# Patient Record
Sex: MALE | Race: WHITE | Employment: UNEMPLOYED | URBAN - METROPOLITAN AREA
[De-identification: names, ages, dates, MRNs, and addresses within clinical notes are randomized per-mention and may not be internally consistent; named-entity substitution may affect disease eponyms.]

---

## 2017-03-07 ENCOUNTER — OFFICE VISIT (OUTPATIENT)
Dept: FAMILY MEDICINE CLINIC | Age: 47
End: 2017-03-07

## 2017-03-07 ENCOUNTER — HOSPITAL ENCOUNTER (OUTPATIENT)
Dept: GENERAL RADIOLOGY | Age: 47
Discharge: HOME | End: 2017-03-07
Payer: COMMERCIAL

## 2017-03-07 VITALS
DIASTOLIC BLOOD PRESSURE: 78 MMHG | BODY MASS INDEX: 23.04 KG/M2 | RESPIRATION RATE: 16 BRPM | HEIGHT: 67 IN | WEIGHT: 146.8 LBS | HEART RATE: 97 BPM | SYSTOLIC BLOOD PRESSURE: 116 MMHG | OXYGEN SATURATION: 99 % | TEMPERATURE: 97.8 F

## 2017-03-07 DIAGNOSIS — M75.50 SUBSCAPULAR BURSITIS: ICD-10-CM

## 2017-03-07 DIAGNOSIS — M54.2 NECK PAIN: ICD-10-CM

## 2017-03-07 DIAGNOSIS — B19.20 HEPATITIS C VIRUS INFECTION, UNSPECIFIED CHRONICITY: ICD-10-CM

## 2017-03-07 DIAGNOSIS — B20 HIV (HUMAN IMMUNODEFICIENCY VIRUS INFECTION) (HCC): Primary | ICD-10-CM

## 2017-03-07 DIAGNOSIS — F33.0 MILD EPISODE OF RECURRENT MAJOR DEPRESSIVE DISORDER (HCC): ICD-10-CM

## 2017-03-07 DIAGNOSIS — M75.50 SUBACROMIAL BURSITIS: ICD-10-CM

## 2017-03-07 DIAGNOSIS — Z23 ENCOUNTER FOR IMMUNIZATION: ICD-10-CM

## 2017-03-07 DIAGNOSIS — F51.01 PRIMARY INSOMNIA: ICD-10-CM

## 2017-03-07 PROCEDURE — 72050 X-RAY EXAM NECK SPINE 4/5VWS: CPT

## 2017-03-07 RX ORDER — EMTRICITABINE, RILPIVIRINE HYDROCHLORIDE, AND TENOFOVIR ALAFENAMIDE 200; 25; 25 MG/1; MG/1; MG/1
TABLET ORAL
COMMUNITY
Start: 2017-03-04 | End: 2022-02-23 | Stop reason: ALTCHOICE

## 2017-03-07 RX ORDER — SERTRALINE HYDROCHLORIDE 100 MG/1
TABLET, FILM COATED ORAL
Qty: 30 TAB | Refills: 1 | Status: SHIPPED | OUTPATIENT
Start: 2017-03-07 | End: 2017-07-11 | Stop reason: SDUPTHER

## 2017-03-07 RX ORDER — IBUPROFEN 800 MG/1
TABLET ORAL
Refills: 0 | COMMUNITY
Start: 2017-01-23 | End: 2017-12-21

## 2017-03-07 RX ORDER — ERGOCALCIFEROL 1.25 MG/1
CAPSULE ORAL
Refills: 5 | COMMUNITY
Start: 2017-02-11 | End: 2017-07-11 | Stop reason: ALTCHOICE

## 2017-03-07 RX ORDER — ZOLPIDEM TARTRATE 10 MG/1
TABLET ORAL
Refills: 2 | COMMUNITY
Start: 2016-12-16 | End: 2017-03-07 | Stop reason: SDUPTHER

## 2017-03-07 RX ORDER — SERTRALINE HYDROCHLORIDE 100 MG/1
TABLET, FILM COATED ORAL
Refills: 2 | COMMUNITY
Start: 2017-02-21 | End: 2017-03-07 | Stop reason: SDUPTHER

## 2017-03-07 RX ORDER — ZOLPIDEM TARTRATE 10 MG/1
TABLET ORAL
Qty: 30 TAB | Refills: 0 | Status: SHIPPED | OUTPATIENT
Start: 2017-03-07 | End: 2017-07-11 | Stop reason: ALTCHOICE

## 2017-03-07 RX ORDER — PREDNISONE 10 MG/1
TABLET ORAL
Qty: 21 TAB | Refills: 0 | Status: SHIPPED | OUTPATIENT
Start: 2017-03-07 | End: 2017-03-21 | Stop reason: ALTCHOICE

## 2017-03-07 NOTE — MR AVS SNAPSHOT
Visit Information Date & Time Provider Department Dept. Phone Encounter #  
 3/7/2017  3:20 PM Monique Sanford MD [address] at [address] [place] [address] [id number] Follow-up Instructions Return in about 4 weeks (around 4/4/2017), or if symptoms worsen or fail to improve. Upcoming Health Maintenance Date Due DTaP/Tdap/Td series (1 - Tdap) 7/26/1992 Allergies as of 3/7/2017  Review Complete On: 3/7/2017 By: Monique Sanford MD  
  
 Severity Noted Reaction Type Reactions Abacavir High 03/07/2017    Other (comments) Interaction with other medication Current Immunizations  Never Reviewed Name Date Tdap  Incomplete Not reviewed this visit You Were Diagnosed With   
  
 Codes Comments [facility] virus [facility]    -  Primary ICD-10-CM: Z08 ICD-9-CM: V08 Hepatitis C virus infection, unspecified chronicity     ICD-10-CM: B19.20 ICD-9-CM: 070.70 Encounter for immunization     ICD-10-CM: B53 ICD-9-CM: V03.89 Mild episode of recurrent major depressive disorder (HCC)     ICD-10-CM: F33.0 ICD-9-CM: 296.31 Primary insomnia     ICD-10-CM: F51.01 
ICD-9-CM: 307.42 Subacromial bursitis     ICD-10-CM: M75.50 ICD-9-CM: 726.19 Neck pain     ICD-10-CM: M54.2 ICD-9-CM: 723.1 Vitals BP Pulse Temp Resp Height(growth percentile) Weight(growth percentile) 116/78 (BP 1 Location: Left arm, BP Patient Position: Sitting) 97 97.8 °F (36.6 °C) (Oral) 16 5' 7\" (1.702 m) 146 lb 12.8 oz (66.6 kg) SpO2 BMI Smoking Status 99% 22.99 kg/m2 Current Every Day Smoker Vitals History BMI and BSA Data Body Mass Index Body Surface Area  
 22.99 kg/m 2 1.77 m 2 Preferred Pharmacy Pharmacy Name Phone [facility], [address] [phone] Your Updated Medication List  
  
   
 This list is accurate as of: 3/7/17  4:34 PM.  Always use your most recent med list.  
  
  
  
  
 ibuprofen 800 mg tablet Commonly known as:  MOTRIN  
TAKE 1 TABLET BY MOUTH EVERY 12 HOURS WITH FOOD  
  
 ODEFSEY 200-25-25 mg Tab Generic drug:  grgrmkfvyp-cnxspksm-pmqhpe ala  
  
 predniSONE 10 mg dose pack Commonly known as:  STERAPRED DS See administration instruction per 10mg dose pack  
  
 sertraline 100 mg tablet Commonly known as:  ZOLOFT  
TAKE ONE TABLET BY MOUTH ONCE DAILY  
  
 suvorexant 15 mg tablet Commonly known as:  [place]low Ache Take 1 Tab by mouth nightly as needed for Insomnia. Max Daily Amount: 15 mg. VITAMIN D2 50,000 unit capsule Generic drug:  ergocalciferol TAKE 1 CAPSULE BY MOUTH 2 TIMES A WEEK  
  
 zolpidem 10 mg tablet Commonly known as:  AMBIEN  
TAKE 1 TABLET BY MOUTH AT BEDTIME Prescriptions Printed Refills  
 zolpidem (AMBIEN) 10 mg tablet 0 Sig: TAKE 1 TABLET BY MOUTH AT BEDTIME Class: Print  
 suvorexant (BELSOMRA) 15 mg tablet 2 Sig: Take 1 Tab by mouth nightly as needed for Insomnia. Max Daily Amount: 15 mg.  
 Class: Print Route: Oral  
  
Prescriptions Sent to Pharmacy Refills  
 sertraline (ZOLOFT) 100 mg tablet 1 Sig: TAKE ONE TABLET BY MOUTH ONCE DAILY Class: Normal  
 Pharmacy: [address] Ph #: [phone]  
 predniSONE (STERAPRED DS) 10 mg dose pack 0 Sig: See administration instruction per 10mg dose pack Class: Normal  
 Pharmacy: [address] Ph #: [phone] We Performed the Following TETANUS, DIPHTHERIA TOXOIDS AND ACELLULAR PERTUSSIS VACCINE (TDAP), IN INDIVIDS. >=7, IM [id number] CPT(R)] Follow-up Instructions Return in about 4 weeks (around 4/4/2017), or if symptoms worsen or fail to improve. To-Do List   
 03/07/2017 Imaging:  XR SPINE CERV 4 OR 5 V Patient Instructions Shoulder Bursitis: Care Instructions Your Care Instructions Bursitis is inflammation of the bursa. A bursa is a small sac of fluid that cushions a joint and helps it move easily. A bursa sits under the highest point of your shoulder. You can get bursitis by overusing your shoulder, which can happen with activities such as lifting, pitching a ball, or painting. Symptoms of bursitis include pain when you move your arm. Your arm may hurt when you try to lift it, and the pain can reach down the side of your arm. You may have trouble sleeping because of the pain. Bursitis usually gets better if you avoid the activity that caused it. If pain lasts or gets worse despite home treatment, your doctor may draw fluid from the bursa through a needle. This may relieve your pain and help your doctor know if you have an infection. If so, your doctor will prescribe antibiotics. If you have inflammation only, you may get a corticosteroid shot to reduce swelling and pain. Sometimes surgery is needed to drain or remove the bursa. Follow-up care is a key part of your treatment and safety. Be sure to make and go to all appointments, and call your doctor if you are having problems. Its also a good idea to know your test results and keep a list of the medicines you take. How can you care for yourself at home? · Put ice or a cold pack on your shoulder for 10 to 20 minutes at a time. Put a thin cloth between the ice and your skin. · After 3 days of using ice, use heat on your shoulder. You can use a hot water bottle, a heating pad set on low, or a warm, moist towel. Some doctors suggest alternating between hot and cold. · Rest your shoulder. Stop any activities that cause pain. Switch to activities that do not stress your shoulder. · Take your medicines exactly as prescribed. Call your doctor if you think you are having a problem with your medicine.  
· If your doctor recommends it, take anti-inflammatory medicines to reduce pain. These include ibuprofen (Advil, Motrin) and naproxen (Aleve). Read and follow all instructions on the label. · To prevent stiffness, gently move the shoulder joint through its full range of motion. As the pain gets better, keep doing range-of-motion exercises. Ask your doctor for exercises that will make the muscles around the shoulder joint stronger. Do these as directed. · You can slowly return to the activity that caused the pain, but do it with less effort until you can do it without pain or swelling. Be sure to warm up before and stretch after you do the activity. When should you call for help? Call your doctor now or seek immediate medical care if: 
· You have a fever. · You have increased swelling or redness in your shoulder. · You cannot use your shoulder, or the pain in your shoulder gets worse. Watch closely for changes in your health, and be sure to contact your doctor if: 
· You have pain for 2 weeks or longer despite home treatment. Where can you learn more? Go to http://carmel-nicole.info/. Enter M955 in the search box to learn more about \"Shoulder Bursitis: Care Instructions. \" Current as of: May 23, 2016 Content Version: 11.1 © 2034-5318 [employer]. Care instructions adapted under license by [employer] (which disclaims liability or warranty for this information). If you have questions about a medical condition or this instruction, always ask your healthcare professional. [place] [address] any warranty or liability for your use of this information. Introducing [facility]! [address] introduces Medical[employer] patient portal. Now you can access parts of your medical record, email your doctor's office, and request medication refills online. 1. In your internet browser, go to [email or web address]. "[employer] 2. Click on the First Time User? Click Here link in the Sign In box.  You will see the New Member Sign Up page. 3. Enter your Belly Ballot Access Code exactly as it appears below. You will not need to use this code after youve completed the sign-up process. If you do not sign up before the expiration date, you must request a new code. · Belly Ballot Access Code: [email or web address] Expires: 6/5/2017  3:55 PM 
 
4. Enter the last four digits of your Social Security Number (xxxx) and Date of Birth (mm/dd/yyyy) as indicated and click Submit. You will be taken to the next sign-up page. 5. Create a Belly Ballot ID. This will be your Belly Ballot login ID and cannot be changed, so think of one that is secure and easy to remember. 6. Create a Belly Ballot password. You can change your password at any time. 7. Enter your Password Reset Question and Answer. This can be used at a later time if you forget your password. 8. Enter your e-mail address. You will receive e-mail notification when new information is available in [address]. 9. Click Sign Up. You can now view and download portions of your medical record. 10. Click the Download Summary menu link to download a portable copy of your medical information. If you have questions, please visit the Frequently Asked Questions section of the Belly [employer]ot website. Remember, Belly Ballot is NOT to be used for urgent needs. For medical emergencies, dial 911. Now available from your iPhone and Android! Please provide this summary of care documentation to your next provider. If you have any questions after today's visit, please call [phone].

## 2017-03-07 NOTE — PROGRESS NOTES
Subjective:     Chief Complaint   Patient presents with   [facility] patient        He  is a 40 y.o. male who presents for evaluation of:  New pt to [facility] care - Prev seeing Dr. Tim Bey. PMHx for HCV (tx with Venita hargrove in 7/2016), Cirrhosis stage 3 and HIV, and Depression with Insomnia. He is being seen at [place] [address] for tx with Dr. Sybil Young. Cholesterol was a little high when last checked around 2/2017. ldl 163, tg 188, total cholesterol 241    Labs from [facility] reviewed and CD4 abs was 330. RPR reactive and Syphilis IgG +. Prev tx for syphilis years ago but does not appear to have confirmatory testing with [facility] recently. Has been told HIV viral load was undetectable. Not currently sexually active. No regular partners. ROS:  Constitutional: negative except for fevers, chills and fatigue  Eyes: negative for visual disturbance  Ears, nose, mouth, throat, and face: negative for hearing loss and sore throat  Respiratory: negative for cough or dyspnea on exertion  Cardiovascular: negative for chest pain, dyspnea, palpitations, fatigue  Gastrointestinal: negative for nausea, vomiting, change in bowel habits, diarrhea and abdominal pain  Genitourinary:negative for frequency and dysuria  Integument/breast: negative for rash and skin lesion(s)  Hematologic/lymphatic: negative for easy bruising and bleeding  Musculoskeletal: R pain under scapula, occ pain shooting down R arm. Sx x few months. Has not been further w/u with x-rays in past.  Pain marry at night when laying on arm.   Neurological: negative for headaches and dizziness  Behavioral/Psych: per HPI    Past Medical History:   Diagnosis Date    Autoimmune disease ([facility]     2001    Liver disease     Hep C treated 7/2016    Psychotic disorder 2014    Depression     Past Surgical History:   Procedure Laterality Date    HX LYMPH NODE DISSECTION Right 2001    Axilla    HX WISDOM TEETH EXTRACTION Bilateral 1995     No current outpatient prescriptions on file prior to visit. No current facility-administered medications on file prior to visit. Objective:     Vitals:    03/07/17 1540   BP: 116/78   Pulse: 97   Resp: 16   Temp: 97.8 °F (36.6 °C)   TempSrc: Oral   SpO2: 99%   Weight: 146 lb 12.8 oz (66.6 kg)   Height: 5' 7\" (1.702 m)     Physical Examination:  General appearance - alert, well appearing, and in no distress  Eyes -sclera anicteric  Neck - supple, no significant adenopathy, no thyromegaly, no bruits  Chest - clear to auscultation, no wheezes, rales or rhonchi, symmetric air entry  Heart - normal rate, regular rhythm, normal S1, S2, no murmurs, rubs, clicks or gallops  Neurological - alert, oriented, no focal findings or movement disorder noted  Msk - R trap ttp, R scapula with mild ttp, neg neers, neg lindsey, neg apley scratch, pain with neck ROM marry with turning head left and flexion  Extr - no edema    Assessment/ Plan:   Dell Mireles was seen today for establish care. Diagnoses and all orders for this visit:    HIV (human immunodeficiency virus infection) ([facility] - per ID. Concern for Syphilis labs. Awaiting records - suspect syphilis tx in past.    Hepatitis C virus infection, unspecified chronicity - tx with Harvoni. Needs US for [facility] screening q6 months. Discussed this with pt and will await records    Encounter for immunization  -     Tetanus, diphtheria toxoids and acellular pertussis (TDAP) vaccine, in individuals >=7 years, IM    Mild episode of recurrent major depressive disorder (HCC)  -     sertraline (ZOLOFT) 100 mg tablet; TAKE ONE TABLET BY MOUTH ONCE DAILY    Primary insomnia - rec pulling away from Ambien though he has been taking this nightly for ~ 10 yrs  -     zolpidem (AMBIEN) 10 mg tablet; TAKE 1 TABLET BY MOUTH AT BEDTIME  -     suvorexant (BELSOMRA) 15 mg tablet; Take 1 Tab by mouth nightly as needed for Insomnia. Max Daily Amount: 15 mg.     Subscapular bursitis - trying steroids to help with inflammation, x-rays of neck too  -     predniSONE (STERAPRED DS) 10 mg dose pack; See administration instruction per 10mg dose pack  -     XR SPINE CERV 4 OR 5 V; Future    Neck pain - suspect some OA so getting x-rays  -     XR SPINE CERV 4 OR 5 V; Future    I have discussed the diagnosis with the patient and the intended plan as seen in the above orders. The patient has received an after-visit summary and questions were answered concerning future plans. I have discussed medication side effects and warnings with the patient as well. The patient verbalizes understanding and agreement with the plan. Follow-up Disposition:  Return in about 4 weeks (around 4/4/2017), or if symptoms worsen or fail to improve.

## 2017-03-07 NOTE — PROGRESS NOTES
Chief Complaint   Patient presents with   Orma Damme Establish Care     New patient   Refill of Ambien and Sertaline  Room 2

## 2017-03-07 NOTE — PATIENT INSTRUCTIONS
Shoulder Bursitis: Care Instructions  Your Care Instructions    Bursitis is inflammation of the bursa. A bursa is a small sac of fluid that cushions a joint and helps it move easily. A bursa sits under the highest point of your shoulder. You can get bursitis by overusing your shoulder, which can happen with activities such as lifting, pitching a ball, or painting. Symptoms of bursitis include pain when you move your arm. Your arm may hurt when you try to lift it, and the pain can reach down the side of your arm. You may have trouble sleeping because of the pain. Bursitis usually gets better if you avoid the activity that caused it. If pain lasts or gets worse despite home treatment, your doctor may draw fluid from the bursa through a needle. This may relieve your pain and help your doctor know if you have an infection. If so, your doctor will prescribe antibiotics. If you have inflammation only, you may get a corticosteroid shot to reduce swelling and pain. Sometimes surgery is needed to drain or remove the bursa. Follow-up care is a key part of your treatment and safety. Be sure to make and go to all appointments, and call your doctor if you are having problems. Its also a good idea to know your test results and keep a list of the medicines you take. How can you care for yourself at home? · Put ice or a cold pack on your shoulder for 10 to 20 minutes at a time. Put a thin cloth between the ice and your skin. · After 3 days of using ice, use heat on your shoulder. You can use a hot water bottle, a heating pad set on low, or a warm, moist towel. Some doctors suggest alternating between hot and cold. · Rest your shoulder. Stop any activities that cause pain. Switch to activities that do not stress your shoulder. · Take your medicines exactly as prescribed. Call your doctor if you think you are having a problem with your medicine.   · If your doctor recommends it, take anti-inflammatory medicines to reduce pain. These include ibuprofen (Advil, Motrin) and naproxen (Aleve). Read and follow all instructions on the label. · To prevent stiffness, gently move the shoulder joint through its full range of motion. As the pain gets better, keep doing range-of-motion exercises. Ask your doctor for exercises that will make the muscles around the shoulder joint stronger. Do these as directed. · You can slowly return to the activity that caused the pain, but do it with less effort until you can do it without pain or swelling. Be sure to warm up before and stretch after you do the activity. When should you call for help? Call your doctor now or seek immediate medical care if:  · You have a fever. · You have increased swelling or redness in your shoulder. · You cannot use your shoulder, or the pain in your shoulder gets worse. Watch closely for changes in your health, and be sure to contact your doctor if:  · You have pain for 2 weeks or longer despite home treatment. Where can you learn more? Go to http://carmel-nicole.info/. Enter M955 in the search box to learn more about \"Shoulder Bursitis: Care Instructions. \"  Current as of: May 23, 2016  Content Version: 11.1  © 9158-6011 [employer]. Care instructions adapted under license by [employer] (which disclaims liability or warranty for this information). If you have questions about a medical condition or this instruction, always ask your healthcare professional. [place] [address] any warranty or liability for your use of this information.

## 2017-03-16 ENCOUNTER — TELEPHONE (OUTPATIENT)
Dept: FAMILY MEDICINE CLINIC | Age: 47
End: 2017-03-16

## 2017-03-16 NOTE — TELEPHONE ENCOUNTER
Pt wants a call about shoulder - it isn't getting better     He thought he had an April apptmnt set but there was nothing in [place]     He wants to be seen as soon as possible     Best number to reach him is [phone]

## 2017-03-21 ENCOUNTER — OFFICE VISIT (OUTPATIENT)
Dept: FAMILY MEDICINE CLINIC | Age: 47
End: 2017-03-21

## 2017-03-21 VITALS
BODY MASS INDEX: 22.6 KG/M2 | DIASTOLIC BLOOD PRESSURE: 73 MMHG | OXYGEN SATURATION: 98 % | HEIGHT: 67 IN | TEMPERATURE: 97.5 F | RESPIRATION RATE: 16 BRPM | SYSTOLIC BLOOD PRESSURE: 109 MMHG | WEIGHT: 144 LBS | HEART RATE: 85 BPM

## 2017-03-21 DIAGNOSIS — M89.8X1 CHRONIC SCAPULAR PAIN: ICD-10-CM

## 2017-03-21 DIAGNOSIS — M79.18 MYOFASCIAL PAIN: Primary | ICD-10-CM

## 2017-03-21 DIAGNOSIS — G89.29 CHRONIC SCAPULAR PAIN: ICD-10-CM

## 2017-03-21 RX ORDER — TRAMADOL HYDROCHLORIDE 50 MG/1
50 TABLET ORAL
Qty: 20 TAB | Refills: 0 | Status: SHIPPED | OUTPATIENT
Start: 2017-03-21 | End: 2017-07-11 | Stop reason: ALTCHOICE

## 2017-03-21 NOTE — PROGRESS NOTES
Subjective:     Chief Complaint   Patient presents with    Shoulder Pain     Right        He  is a 40 y.o. male who presents for evaluation of:  Scapular pain - x months, pain that was occ shooting down R arm has improved with Prednisone. Nothing has improved pain above scapula. Using heat. X-rays of neck showed sig OA: Degenerative spondylosis C3-C6 with narrowed disc at C3-C4. ROS: per HPI    Past Medical History:   Diagnosis Date    Autoimmune disease ([facility]     2001    Liver disease     Hep C treated 7/2016    Psychotic disorder 2014    Depression     Past Surgical History:   Procedure Laterality Date    HX LYMPH NODE DISSECTION Right 2001    Axilla    HX WISDOM TEETH EXTRACTION Bilateral 1995     Current Outpatient Prescriptions on File Prior to Visit   Medication Sig Dispense Refill    ODEFSEY 200-25-25 mg tab       VITAMIN D2 50,000 unit capsule TAKE 1 CAPSULE BY MOUTH 2 TIMES A WEEK  5    ibuprofen (MOTRIN) 800 mg tablet TAKE 1 TABLET BY MOUTH EVERY 12 HOURS WITH FOOD  0    sertraline (ZOLOFT) 100 mg tablet TAKE ONE TABLET BY MOUTH ONCE DAILY 30 Tab 1    zolpidem (AMBIEN) 10 mg tablet TAKE 1 TABLET BY MOUTH AT BEDTIME 30 Tab 0    suvorexant (BELSOMRA) 15 mg tablet Take 1 Tab by mouth nightly as needed for Insomnia. Max Daily Amount: 15 mg. 30 Tab 2     No current facility-administered medications on file prior to visit.          Objective:     Vitals:    03/21/17 1129   BP: 109/73   Pulse: 85   Resp: 16   Temp: 97.5 °F (36.4 °C)   TempSrc: Oral   SpO2: 98%   Weight: 144 lb (65.3 kg)   Height: 5' 7\" (1.702 m)     Physical Examination:  General appearance - alert, well appearing, and in no distress  Eyes -sclera anicteric  Chest - clear to auscultation, no wheezes, rales or rhonchi, symmetric air entry  Heart - normal rate, regular rhythm, normal S1, S2, no murmurs, rubs, clicks or gallops  Msk - R trap with no ttp, Trigger point just superior to R scapula very ttp at medial superior edge, neg nenino, neg lindsey, neg apley scratch, pain with neck ROM marry with turning head left and flexion    Procedure:  After verbal consent was obtained, risks and benefits of the procedure were discussed with the patient and alternatives discussed. Cleansed with alcohol pads. Injected 1% lidocaine 0.5 ml into trigger point over right scapula. The procedure was well tolerated with no complications      Assessment/ Plan:   There are no diagnoses linked to this encounter. I have discussed the diagnosis with the patient and the intended plan as seen in the above orders. The patient has received an after-visit summary and questions were answered concerning future plans. I have discussed medication side effects and warnings with the patient as well. The patient verbalizes understanding and agreement with the plan.     Follow-up Disposition: Not on [address] NOTE        Chart reviewed for the following:   Ashok Burgess MD, have reviewed the History, Physical and updated the Allergic reactions for [address] performed immediately prior to start of procedure:   Ashok Burgess MD, have performed the following reviews on [place] prior to the start of the procedure:            * Patient was identified by name and date of birth   * Agreement on procedure being performed was verified  * Risks and Benefits explained to the patient  * Procedure site verified and marked as necessary  * Patient was positioned for comfort  * Consent was signed and verified     Time: 11:54am      Date of procedure: 3/21/2017    Procedure performed by:  Modesto Burch MD    Patient assisted by: self    How tolerated by patient: tolerated the procedure well with no complications    Post Procedural Pain Scale: 0 - No Hurt

## 2017-03-21 NOTE — PROGRESS NOTES
Chief Complaint   Patient presents with    Shoulder Pain     Right   Discuss treatment for continued pain  Room 5

## 2017-03-21 NOTE — PATIENT INSTRUCTIONS
Shoulder Blade: Exercises  Your Care Instructions  Here are some examples of typical exercises for your condition. Start each exercise slowly. Ease off the exercise if you start to have pain. Your doctor or physical therapist will tell you when you can start these exercises and which ones will work best for you. How to do the exercises  Shoulder roll    1. Stand tall with your chin slightly tucked. Imagine that a string at the top of your head is pulling you straight up. 2. Keep your arms relaxed. All motion will be in your shoulders. 3. Shrug your shoulders up toward your ears, then up and back. [place] your shoulders down and back, like you're sliding your hands down into your back pants pockets. 4. Repeat the circles at least 2 to 4 times. This exercise is also helpful anytime you want to relax. Lower neck and upper back stretch    1. With your arms about shoulder height, clasp your hands in front of you. 2. Drop your chin toward your chest.  3. Reach straight forward so you are rounding your upper back. Think about pulling your shoulder blades apart. Maryellen Wayne feel a stretch across your upper back and shoulders. Hold for at least 6 seconds. 4. Repeat 2 to 4 times. Triceps stretch    1. Reach your arm straight up. 2. Keeping your elbow in place, bend your arm and reach your hand down behind your back. 3. With your other hand, apply gentle pressure to the bent elbow. Maryellen Wayne feel a stretch at the back of your upper arm and shoulder. Hold about 6 seconds. 4. Repeat 2 to 4 times with each arm. Shoulder stretch    1. Relax your shoulders. 2. Raise one arm to shoulder height, and reach it across your chest.  3. Pull the arm slightly toward you with your other arm. This will help you get a gentle stretch. Hold for about 6 seconds. 4. Repeat 2 to 4 times. Shoulder blade squeeze    1. Sit or stand up tall with your arms at your sides. 2. Keep your shoulders relaxed and down, not shrugged.   3. Squeeze your shoulder blades together. Hold for 6 seconds, then relax. 4. Repeat 8 to 12 times. Straight-arm shoulder blade squeeze    1. Sit or stand tall. Relax your shoulders. 2. With palms down, hold your elastic tubing or band straight out in front of you. 3. Start with slight tension in the tubing or band, with your hands about shoulder-width apart. 4. Slowly pull straight out to the sides, squeezing your shoulder blades together. Keep your arms straight and at shoulder height. Slowly release. 5. Repeat 8 to 12 times. Rowing    1. [place] your elastic tubing or band at about waist height. Take one end in each hand. 2. Sit or stand with your feet hip-width apart. 3. Hold your arms straight in front of you. Adjust your distance to create slight tension in the tubing or band. 4. Slightly tuck your chin. Relax your shoulders. 5. Without shrugging your shoulders, pull straight back. Your elbows will pass alongside your waist.  Pull-downs    1. [place] your elastic tubing or band in the top of a closed door. Take one end in each hand. 2. Either sit or stand, depending on what is more comfortable. If you feel unsteady, sit on a chair. 3. Start with your arms up and comfortably apart, elbows straight. There should be a slight tension in the tubing or band. 4. Slightly tuck your chin, and look straight ahead. 5. Keeping your back straight, slowly pull down and back, bending your elbows. 6. Stop where your hands are level with your chin, in a \"goalpost\" position. 7. Repeat 8 to 12 times. Chest T stretch    1. Lie on your back. Raise your knees so they are bent. Plant your feet on the floor, hip-width apart. 2. Tuck your chin, and relax your shoulders. 3. Reach your arms straight out to the sides. If you don't feel a mild stretch in your shoulders and across your chest, use a foam roll or a tightly rolled blanket under your spine, from your tailbone to your head.   4. Relax in this position for at least 15 to 30 seconds while you breathe normally. Repeat 2 to 4 times. As you get used to this stretch, keep adding a little more time until you are able relax in this position for 2 or 3 minutes. When you can relax for at least 2 minutes, you only need to do the exercise 1 time per session. Chest goalpost stretch    1. Lie on your back. Raise your knees so they are bent. Plant your feet on the floor, hip-width apart. 2. Tuck your chin, and relax your shoulders. 3. Reach your arms straight out to the sides. 4. Bend your arms at the elbows, with your hands pointed toward the top of your head. Your arms should make an L on either side of your head. Your palms should be facing up. 5. If you don't feel a mild stretch in your shoulders and across your chest, use a foam roll or tightly rolled blanket under your spine, from your tailbone to your head. 6. Relax in this position for at least 15 to 30 seconds while you breathe normally. Repeat 2 to 4 times. Each day you do this exercise, add a little more time until you can relax in this position for 2 or 3 minutes. When you can relax for at least 2 minutes, you only need to do the exercise 1 time per session. Follow-up care is a key part of your treatment and safety. Be sure to make and go to all appointments, and call your doctor if you are having problems. It's also a good idea to know your test results and keep a list of the medicines you take. Where can you learn more? Go to http://carmel-nicole.info/. Enter ([phone] in the search box to learn more about \"Shoulder Blade: Exercises. \"  Current as of: May 23, 2016  Content Version: 11.1  © 2435-7455 [employer]. Care instructions adapted under license by [employer] (which disclaims liability or warranty for this information).  If you have questions about a medical condition or this instruction, always ask your healthcare professional. Osiris Bond disclaims any warranty or liability for your use of this information.

## 2017-03-21 NOTE — MR AVS SNAPSHOT
Visit Information Date & Time Provider Department Dept. Phone Encounter #  
 3/21/2017 11:10 AM Sandrine Lagos MD [facility] at [address] [id number] Follow-up Instructions Return in about 4 weeks (around 4/18/2017), or if symptoms worsen or fail to improve. Upcoming Health Maintenance Date Due Pneumococcal 19-64 Highest Risk (1 of 3 - PCV13) 7/26/1990 DTaP/Tdap/Td series (2 - Td) 3/7/2027 Allergies as of 3/21/2017  Review Complete On: 3/7/2017 By: Sandrine Lagos MD  
  
 Severity Noted Reaction Type Reactions Abacavir High 03/07/2017    Other (comments) Interaction with other medication Current Immunizations  Never Reviewed Name Date Influenza Vaccine 10/20/2016 Pneumococcal Conjugate (PCV-13) 3/21/2015 Tdap 3/7/2017 Not reviewed this visit You Were Diagnosed With   
  
 Codes Comments Myofascial pain    -  Primary ICD-10-CM: M79.1 ICD-9-CM: 729.1 Chronic scapular pain     ICD-10-CM: M89.8X1, G89.29 ICD-9-CM: 733.90, 338.29 Vitals BP Pulse Temp Resp Height(growth percentile) Weight(growth percentile) 109/73 (BP 1 Location: Right arm, BP Patient Position: Sitting) 85 97.5 °F (36.4 °C) (Oral) 16 5' 7\" (1.702 m) 144 lb (65.3 kg) SpO2 BMI Smoking Status 98% 22.55 kg/m2 Current Every Day Smoker BMI and BSA Data Body Mass Index Body Surface Area  
 22.55 kg/m 2 1.76 m 2 Preferred Pharmacy Pharmacy Name Phone [facility] [address] [phone] Your Updated Medication List  
  
   
This list is accurate as of: 3/21/17 12:02 PM.  Always use your most recent med list.  
  
  
  
  
 ibuprofen 800 mg tablet Commonly known as:  MOTRIN  
TAKE 1 TABLET BY MOUTH EVERY 12 HOURS WITH FOOD  
  
 ODEFSEY 200-25-25 mg Tab Generic drug:  htgclevgou-yyotudnb-dxpeir ala  
  
 sertraline 100 mg tablet Commonly known as:  ZOLOFT  
 TAKE ONE TABLET BY MOUTH ONCE DAILY  
  
 suvorexant 15 mg tablet Commonly known as:  June Mcclellan Take 1 Tab by mouth nightly as needed for Insomnia. Max Daily Amount: 15 mg. VITAMIN D2 50,000 unit capsule Generic drug:  ergocalciferol TAKE 1 CAPSULE BY MOUTH 2 TIMES A WEEK  
  
 zolpidem 10 mg tablet Commonly known as:  AMBIEN  
TAKE 1 TABLET BY MOUTH AT BEDTIME We Performed the Following [facility] INJECT TRIGGER POINT, 1 OR 2 [id number] CPT(R)] Follow-up Instructions Return in about 4 weeks (around 4/18/2017), or if symptoms worsen or fail to improve. Patient Instructions Shoulder Blade: Exercises Your Care Instructions Here are some examples of typical exercises for your condition. Start each exercise slowly. Ease off the exercise if you start to have pain. Your doctor or physical therapist will tell you when you can start these exercises and which ones will work best for you. How to do the exercises Shoulder roll 1. Stand tall with your chin slightly tucked. Imagine that a string at the top of your head is pulling you straight up. 2. Keep your arms relaxed. All motion will be in your shoulders. 3. Shrug your shoulders up toward your ears, then up and back. [place] your shoulders down and back, like you're sliding your hands down into your back pants pockets. 4. Repeat the circles at least 2 to 4 times. This exercise is also helpful anytime you want to relax. Lower neck and upper back stretch 1. With your arms about shoulder height, clasp your hands in front of you. 2. Drop your chin toward your chest. 
3. Reach straight forward so you are rounding your upper back. Think about pulling your shoulder blades apart. Riley Azul feel a stretch across your upper back and shoulders. Hold for at least 6 seconds. 4. Repeat 2 to 4 times. Triceps stretch 1. Reach your arm straight up.  
2. Keeping your elbow in place, bend your arm and reach your hand down behind your back. 3. With your other hand, apply gentle pressure to the bent elbow. Darryl Matter feel a stretch at the back of your upper arm and shoulder. Hold about 6 seconds. 4. Repeat 2 to 4 times with each arm. Shoulder stretch 1. Relax your shoulders. 2. Raise one arm to shoulder height, and reach it across your chest. 
3. Pull the arm slightly toward you with your other arm. This will help you get a gentle stretch. Hold for about 6 seconds. 4. Repeat 2 to 4 times. Shoulder blade squeeze 1. Sit or stand up tall with your arms at your sides. 2. Keep your shoulders relaxed and down, not shrugged. 3. Squeeze your shoulder blades together. Hold for 6 seconds, then relax. 4. Repeat 8 to 12 times. Straight-arm shoulder blade squeeze 1. Sit or stand tall. Relax your shoulders. 2. With palms down, hold your elastic tubing or band straight out in front of you. 3. Start with slight tension in the tubing or band, with your hands about shoulder-width apart. 4. Slowly pull straight out to the sides, squeezing your shoulder blades together. Keep your arms straight and at shoulder height. Slowly release. 5. Repeat 8 to 12 times. Rowing 1. [place] your elastic tubing or band at about waist height. Take one end in each hand. 2. Sit or stand with your feet hip-width apart. 3. Hold your arms straight in front of you. Adjust your distance to create slight tension in the tubing or band. 4. Slightly tuck your chin. Relax your shoulders. 5. Without shrugging your shoulders, pull straight back. Your elbows will pass alongside your waist. 
Pull-downs 1. [place] your elastic tubing or band in the top of a closed door. Take one end in each hand. 2. Either sit or stand, depending on what is more comfortable. If you feel unsteady, sit on a chair. 3. Start with your arms up and comfortably apart, elbows straight. There should be a slight tension in the tubing or band. 4. Slightly tuck your chin, and look straight ahead. 5. Keeping your back straight, slowly pull down and back, bending your elbows. 6. Stop where your hands are level with your chin, in a \"goalpost\" position. 7. Repeat 8 to 12 times. Chest T stretch 1. Lie on your back. Raise your knees so they are bent. Plant your feet on the floor, hip-width apart. 2. Tuck your chin, and relax your shoulders. 3. Reach your arms straight out to the sides. If you don't feel a mild stretch in your shoulders and across your chest, use a foam roll or a tightly rolled blanket under your spine, from your tailbone to your head. 4. Relax in this position for at least 15 to 30 seconds while you breathe normally. Repeat 2 to 4 times. As you get used to this stretch, keep adding a little more time until you are able relax in this position for 2 or 3 minutes. When you can relax for at least 2 minutes, you only need to do the exercise 1 time per session. Chest goalpost stretch 1. Lie on your back. Raise your knees so they are bent. Plant your feet on the floor, hip-width apart. 2. Tuck your chin, and relax your shoulders. 3. Reach your arms straight out to the sides. 4. Bend your arms at the elbows, with your hands pointed toward the top of your head. Your arms should make an L on either side of your head. Your palms should be facing up. 5. If you don't feel a mild stretch in your shoulders and across your chest, use a foam roll or tightly rolled blanket under your spine, from your tailbone to your head. 6. Relax in this position for at least 15 to 30 seconds while you breathe normally. Repeat 2 to 4 times. Each day you do this exercise, add a little more time until you can relax in this position for 2 or 3 minutes. When you can relax for at least 2 minutes, you only need to do the exercise 1 time per session. Follow-up care is a key part of your treatment and safety.  Be sure to make and go to all appointments, and call your doctor if you are having problems. It's also a good idea to know your test results and keep a list of the medicines you take. Where can you learn more? Go to http://carmel-nicole.info/. Enter ([phone] 7072 2666 in the search box to learn more about \"Shoulder Blade: Exercises. \" Current as of: May 23, 2016 Content Version: 11.1 © 8464-4616 [employer]. Care instructions adapted under license by [employer] (which disclaims liability or warranty for this information). If you have questions about a medical condition or this instruction, always ask your healthcare professional. [address] any warranty or liability for your use of this information. Introducing [facility] & HEALTH SERVICES! Dear Srinivasa Wood: 
Thank you for requesting a Elance account. Our records indicate that you already have an active Elance account. You can access your account anytime at [email or web address] Did you know that you can access your hospital and ER discharge instructions at any time in Elance? You can also review all of your test results from your hospital stay or ER visit. Additional Information If you have questions, please visit the Frequently Asked Questions section of the Elance website at [email or web address] Remember, Elance is NOT to be used for urgent needs. For medical emergencies, dial 911. Now available from your iPhone and Android! Please provide this summary of care documentation to your next provider. Your primary care clinician is listed as Sandrine Lagos. If you have any questions after today's visit, please call [phone].

## 2017-05-05 ENCOUNTER — DOCUMENTATION ONLY (OUTPATIENT)
Dept: FAMILY MEDICINE CLINIC | Age: 47
End: 2017-05-05

## 2017-06-13 ENCOUNTER — TELEPHONE (OUTPATIENT)
Dept: FAMILY MEDICINE CLINIC | Age: 47
End: 2017-06-13

## 2017-06-13 NOTE — TELEPHONE ENCOUNTER
Pt calling for referral for tomorrow     [facility] -   Dr. Camejo Files   RE:  6 mos f/u     9:00 am on 6/14/17    Best number to reach him is [phone]

## 2017-07-11 ENCOUNTER — OFFICE VISIT (OUTPATIENT)
Dept: FAMILY MEDICINE CLINIC | Age: 47
End: 2017-07-11

## 2017-07-11 VITALS
OXYGEN SATURATION: 98 % | HEART RATE: 70 BPM | WEIGHT: 139.4 LBS | SYSTOLIC BLOOD PRESSURE: 97 MMHG | TEMPERATURE: 97.6 F | RESPIRATION RATE: 16 BRPM | DIASTOLIC BLOOD PRESSURE: 66 MMHG | BODY MASS INDEX: 21.88 KG/M2 | HEIGHT: 67 IN

## 2017-07-11 DIAGNOSIS — F33.0 MILD EPISODE OF RECURRENT MAJOR DEPRESSIVE DISORDER (HCC): ICD-10-CM

## 2017-07-11 DIAGNOSIS — F51.01 PRIMARY INSOMNIA: ICD-10-CM

## 2017-07-11 DIAGNOSIS — Z00.00 WELL ADULT EXAM: Primary | ICD-10-CM

## 2017-07-11 DIAGNOSIS — A63.0 GENITAL WARTS: ICD-10-CM

## 2017-07-11 DIAGNOSIS — R35.1 NOCTURIA: ICD-10-CM

## 2017-07-11 LAB
BILIRUB UR QL STRIP: NORMAL
GLUCOSE UR-MCNC: NEGATIVE MG/DL
KETONES P FAST UR STRIP-MCNC: NEGATIVE MG/DL
PH UR STRIP: 6.5 [PH] (ref 4.6–8)
PROT UR QL STRIP: NORMAL MG/DL
SP GR UR STRIP: 1.02 (ref 1–1.03)
UA UROBILINOGEN AMB POC: NORMAL (ref 0.2–1)
URINALYSIS CLARITY POC: CLEAR
URINALYSIS COLOR POC: NORMAL
URINE BLOOD POC: NEGATIVE
URINE LEUKOCYTES POC: NEGATIVE
URINE NITRITES POC: NEGATIVE

## 2017-07-11 RX ORDER — PRAVASTATIN SODIUM 40 MG/1
TABLET ORAL
COMMUNITY
Start: 2017-07-10 | End: 2019-03-29

## 2017-07-11 RX ORDER — SERTRALINE HYDROCHLORIDE 100 MG/1
TABLET, FILM COATED ORAL
Qty: 30 TAB | Refills: 2 | Status: SHIPPED | OUTPATIENT
Start: 2017-07-11 | End: 2017-07-11 | Stop reason: SDUPTHER

## 2017-07-11 RX ORDER — IMIQUIMOD 12.5 MG/.25G
CREAM TOPICAL
Qty: 36 PACKET | Refills: 0 | Status: SHIPPED | OUTPATIENT
Start: 2017-07-11 | End: 2017-12-21

## 2017-07-11 NOTE — MR AVS SNAPSHOT
Visit Information Date & Time Provider Department Dept. Phone Encounter #  
 7/11/2017  9:50 AM Debbie Alberts MD [facility] at [address] [id number] Follow-up Instructions Return in about 4 weeks (around 8/8/2017), or if symptoms worsen or fail to improve. Upcoming Health Maintenance Date Due INFLUENZA AGE 9 TO ADULT 8/1/2017 Pneumococcal 19-64 Highest Risk (3 of 3 - PPSV23) 7/11/2022 DTaP/Tdap/Td series (2 - Td) 3/7/2027 Allergies as of 7/11/2017  Review Complete On: 7/11/2017 By: Debbie Alberts MD  
  
 Severity Noted Reaction Type Reactions Abacavir High 03/07/2017    Other (comments) Interaction with other medication Current Immunizations  Never Reviewed Name Date Influenza Vaccine 10/20/2016 Pneumococcal Conjugate (PCV-13) 3/21/2015 Tdap 3/7/2017 Not reviewed this visit You Were Diagnosed With   
  
 Codes Comments Well adult exam    -  Primary ICD-10-CM: Z00.00 ICD-9-CM: V70.0 Primary insomnia     ICD-10-CM: F51.01 
ICD-9-CM: 307.42 Mild episode of recurrent major depressive disorder (HCC)     ICD-10-CM: F33.0 ICD-9-CM: 296.31 Nocturia     ICD-10-CM: R35.1 ICD-9-CM: 788.43 Genital warts     ICD-10-CM: A63.0 ICD-9-CM: 078.11 Vitals BP Pulse Temp Resp Height(growth percentile) Weight(growth percentile) 97/66 (BP 1 Location: Right arm, BP Patient Position: Sitting) 70 97.6 °F (36.4 °C) (Oral) 16 5' 7\" (1.702 m) 139 lb 6.4 oz (63.2 kg) SpO2 BMI Smoking Status 98% 21.83 kg/m2 Current Every Day Smoker Vitals History BMI and BSA Data Body Mass Index Body Surface Area  
 21.83 kg/m 2 1.73 m 2 Preferred Pharmacy Pharmacy Name Phone [facility] [address] [facility], [address] [phone] Your Updated Medication List  
  
   
This list is accurate as of: 7/11/17 11:48 AM.  Always use your most recent med list.  
  
  
 ibuprofen 800 mg tablet Commonly known as:  MOTRIN  
TAKE 1 TABLET BY MOUTH EVERY 12 HOURS WITH FOOD  
  
 imiquimod 5 % cream  
Commonly known as:  ALDARA  
apply a thin layer to affected area 3x per week (Monday, Wednesday, and Friday) x 12 weeks ODEFSEY 200-25-25 mg Tab Generic drug:  uimkzioeyy-bipefywu-rmvvww ala  
  
 pravastatin 40 mg tablet Commonly known as:  PRAVACHOL  
  
 sertraline 100 mg tablet Commonly known as:  ZOLOFT  
TAKE ONE TABLET BY MOUTH ONCE DAILY  
  
 suvorexant 15 mg tablet Commonly known as:  Suella Joseph Take 1 Tab by mouth nightly as needed for Insomnia. Max Daily Amount: 15 mg.  
  
  
  
  
Prescriptions Printed Refills  
 suvorexant (BELSOMRA) 15 mg tablet 5 Sig: Take 1 Tab by mouth nightly as needed for Insomnia. Max Daily Amount: 15 mg.  
 Class: Print Route: Oral  
  
Prescriptions Sent to Pharmacy Refills  
 sertraline (ZOLOFT) 100 mg tablet 2 Sig: TAKE ONE TABLET BY MOUTH ONCE DAILY Class: Normal  
 Pharmacy: [address] Ph #: [phone]  
 imiquimod (ALDARA) 5 % cream 0 Sig: apply a thin layer to affected area 3x per week (Monday, Wednesday, and Friday) x 12 weeks Class: Normal  
 Pharmacy: [address] Ph #: [phone] We Performed the Following AMB POC URINALYSIS DIP STICK AUTO W/O MICRO [53550 CPT(R)] HEMOGLOBIN A1C WITH EAG [57600 CPT(R)] PROSTATE SPECIFIC AG (PSA) [id number] CPT(R)] TSH 3RD GENERATION [21389 CPT(R)] Follow-up Instructions Return in about 4 weeks (around 8/8/2017), or if symptoms worsen or fail to improve. Introducing [facility] & HEALTH SERVICES! Dear Bard Davies: 
Thank you for requesting a [employer] account. Our records indicate that you already have an active Sure[employer]Sign [employer] account. You can access your account anytime at [email or web address] Did you know that you can access your hospital and ER discharge instructions at any time in [email or web address]? You can also review all of your test results from your hospital stay or ER visit. Additional Information If you have questions, please visit the Frequently Asked Questions section of the [email or web address] website at [email or web address] Remember, [email or web address] is NOT to be used for urgent needs. For medical emergencies, dial 911. Now available from your iPhone and Android! Please provide this summary of care documentation to your next provider. Your primary care clinician is listed as Delorise Los Altos. If you have any questions after today's visit, please call [phone].

## 2017-07-11 NOTE — PROGRESS NOTES
Chief Complaint   Patient presents with   [facility] Complete Physical     Annual   Room 5

## 2017-07-11 NOTE — PROGRESS NOTES
Subjective:     Chief Complaint   Patient presents with    Complete Physical     Annual      He  is a 40 y.o. male who presents for evaluation of: CPE    Today c/o rawness with itching and some fecal discharge around anus and notices genital wart appearance for the past 2-3 weeks. No rectal bleeding or tenesmus. Worse when sweating. Not currently sexually active but hx of receptive anal intercourse. No regular partners. HIV well controlled. Does not think he has every been told he has HPV. No n/v, diarrhea, or constipation. No fevers. No concerns with inguinal LAD. Labs from [facility] reviewed and CD4 abs was 330. RPR reactive and Syphilis IgG +. Prev tx for syphilis years ago but does not appear to have confirmatory testing with [facility] recently. Has been told HIV viral load was undetectable. Exercising - walking  Dieting - Yes  Smoking - 1 ppd  Dentist - seen regularly    PMHx for HCV (tx with Miguel Pay back in 7/2016), Cirrhosis stage 3,  HIV tx with Odefsey, and Depression with Insomnia. He is being seen at [place] [address] for tx with ALONSO Sánchez. Depression well controlled with Zoloft. Cholesterol was a little high when last checked around 2/2017. ldl 163, tg 188, total cholesterol 241. Started on Pravastatin. Vit D Def - has been taking weekly Vit D (prev on 2 x per week). Recent levels with ID showed Vit D of 91 so supplement was stopped. We discussed that Vit D tox is rare and I would not expect this until levels were much higher.     ROS:  Constitutional: negative for fevers, chills and fatigue  Eyes: negative for visual disturbance  Ears, nose, mouth, throat, and face: negative for hearing loss and sore throat  Respiratory: negative for cough or dyspnea on exertion  Cardiovascular: negative for chest pain, dyspnea, palpitations, fatigue  Gastrointestinal: per HPI  Genitourinary:negative for frequency and dysuria  Integument/breast: negative for rash and skin lesion(s)  Hematologic/lymphatic: negative for easy bruising and bleeding  Musculoskeletal:negative for myalgias and muscle weakness  Neurological: negative for headaches and dizziness  Behavioral/Psych: per HPI     Objective:     Vitals:    07/11/17 1034   BP: 97/66   Pulse: 70   Resp: 16   Temp: 97.6 °F (36.4 °C)   TempSrc: Oral   SpO2: 98%   Weight: 139 lb 6.4 oz (63.2 kg)   Height: 5' 7\" (1.702 m)     Physical Examination:  General appearance - alert, well appearing, and in no distress  Eyes - sclera anicteric  Nose - normal and patent, no erythema, discharge or polyps  Mouth - mucous membranes moist, pharynx normal without lesions  Neck - supple, no significant adenopathy  Lymphatics - no palpable lymphadenopathy, no hepatosplenomegaly  Chest - clear to auscultation, no wheezes, rales or rhonchi, symmetric air entry  Heart - normal rate, regular rhythm, normal S1, S2, no murmurs, rubs, clicks or gallops  Abdomen - soft, nontender, nondistended, no masses or organomegaly  Rectal - single verrucous papilliform type growth protruding from anus. Mild surrounding erythema  Neurological - alert, oriented, normal speech, no focal findings or movement disorder noted  Musculoskeletal - no joint tenderness, deformity or swelling  Extremities - no edema  Psych - nml mood and affect    Past Medical History:   Diagnosis Date    Autoimmune disease ([address]     2001    Liver disease     Hep C treated 7/2016    Psychotic disorder 2014    Depression     Past Surgical History:   Procedure Laterality Date    HX LYMPH NODE DISSECTION Right 2001    Axilla    HX WISDOM TEETH EXTRACTION Bilateral 1995     Current Outpatient Prescriptions on File Prior to Visit   Medication Sig Dispense Refill    ODEFSEY 200-25-25 mg tab       ibuprofen (MOTRIN) 800 mg tablet TAKE 1 TABLET BY MOUTH EVERY 12 HOURS WITH FOOD  0     No current facility-administered medications on file prior to visit.         Assessment/ Plan:   Alison Willard was seen today for complete physical.    Diagnoses and all orders for this visit:    Well adult exam  -     PROSTATE SPECIFIC AG  -     HEMOGLOBIN A1C WITH EAG  -     TSH 3RD GENERATION  -     AMB POC URINALYSIS DIP STICK AUTO W/O MICRO    Primary insomnia - improved sleep with Belsomra, does wake up to urinate nightly  -     suvorexant (BELSOMRA) 15 mg tablet; Take 1 Tab by mouth nightly as needed for Insomnia. Max Daily Amount: 15 mg. Mild episode of recurrent major depressive disorder (HCC) - stable  -     sertraline (ZOLOFT) 100 mg tablet; TAKE ONE TABLET BY MOUTH ONCE DAILY    Nocturia - as above, UA with no signs of UTI  -     PROSTATE SPECIFIC AG  -     AMB POC URINALYSIS DIP STICK AUTO W/O MICRO    Anal lesion - will tx for condyloma but if not improving in 4 weeks, would strongly consider anal squamous intraepithelial lesion and will need further w/u with bx.  -     imiquimod (ALDARA) 5 % cream; apply a thin layer to affected area 3x per week (Monday, Wednesday, and Friday) x 12 weeks    I have discussed the diagnosis with the patient and the intended plan as seen in the above orders. The patient has received an after-visit summary and questions were answered concerning future plans. I have discussed medication side effects and warnings with the patient as well. The patient verbalizes understanding and agreement with the plan. Follow-up Disposition:  Return in about 4 weeks (around 8/8/2017), or if symptoms worsen or fail to improve.

## 2017-07-12 LAB
EST. AVERAGE GLUCOSE BLD GHB EST-MCNC: 103 MG/DL
HBA1C MFR BLD: 5.2 % (ref 4.8–5.6)
PSA SERPL-MCNC: 0.6 NG/ML (ref 0–4)
TSH SERPL DL<=0.005 MIU/L-ACNC: 0.63 UIU/ML (ref 0.45–4.5)

## 2017-07-18 ENCOUNTER — TELEPHONE (OUTPATIENT)
Dept: FAMILY MEDICINE CLINIC | Age: 47
End: 2017-07-18

## 2017-07-18 NOTE — TELEPHONE ENCOUNTER
Pt reports worsening of symptom since last apptmnt   His bottom is red and hurts very much, hard to walk   PSR scheduled him for 7/19/17 but he wanted to let Dr. Yamile Egan know what was going on     Best number to reach him is [phone]

## 2017-07-19 ENCOUNTER — OFFICE VISIT (OUTPATIENT)
Dept: FAMILY MEDICINE CLINIC | Age: 47
End: 2017-07-19

## 2017-07-19 VITALS
HEART RATE: 65 BPM | WEIGHT: 137 LBS | BODY MASS INDEX: 21.5 KG/M2 | TEMPERATURE: 97.5 F | OXYGEN SATURATION: 98 % | HEIGHT: 67 IN | SYSTOLIC BLOOD PRESSURE: 107 MMHG | DIASTOLIC BLOOD PRESSURE: 75 MMHG | RESPIRATION RATE: 16 BRPM

## 2017-07-19 DIAGNOSIS — K62.9 ANAL LESION: Primary | ICD-10-CM

## 2017-07-19 DIAGNOSIS — G89.29 CHRONIC LEFT SI JOINT PAIN: ICD-10-CM

## 2017-07-19 DIAGNOSIS — M53.3 CHRONIC LEFT SI JOINT PAIN: ICD-10-CM

## 2017-07-19 RX ORDER — TRAMADOL HYDROCHLORIDE 50 MG/1
50 TABLET ORAL
Qty: 30 TAB | Refills: 0 | Status: SHIPPED | OUTPATIENT
Start: 2017-07-19 | End: 2017-12-21

## 2017-07-19 RX ORDER — PREDNISONE 20 MG/1
TABLET ORAL
Qty: 18 TAB | Refills: 0 | Status: SHIPPED | OUTPATIENT
Start: 2017-07-19 | End: 2017-07-28

## 2017-07-19 NOTE — PROGRESS NOTES
Chief Complaint   Patient presents with    Anal Pain     Itching,Burning,No bleeding but yellowish greenish drainage from rectum   Room 5      Appointment scheduled for Dr Kamran Velásquez @ [address] [place] [address] for 7/21/17 @ 11 AM

## 2017-07-19 NOTE — PROGRESS NOTES
Labs looked good. Pt seen again in office today. Can review labs further at f/u appt coming up soon.

## 2017-07-19 NOTE — MR AVS SNAPSHOT
Visit Information Date & Time Provider Department Dept. Phone Encounter #  
 7/19/2017 11:10 AM Gillian Macias MD [facility] at [address] [id number] Follow-up Instructions Return in about 4 weeks (around 8/16/2017), or if symptoms worsen or fail to improve. Your Appointments 8/15/2017  8:30 AM  
ROUTINE CARE with Gillian Macias MD  
[facility] at [facility] [address]) Appt Note: 4-6 week [facility] [address] [place] [address] Upcoming Health Maintenance Date Due INFLUENZA AGE 9 TO ADULT 8/1/2017 Pneumococcal 19-64 Highest Risk (3 of 3 - PPSV23) 7/11/2022 DTaP/Tdap/Td series (2 - Td) 3/7/2027 Allergies as of 7/19/2017  Review Complete On: 7/19/2017 By: Gillian Macias MD  
  
 Severity Noted Reaction Type Reactions Abacavir High 03/07/2017    Other (comments) Interaction with other medication Current Immunizations  Never Reviewed Name Date Influenza Vaccine 10/20/2016 Pneumococcal Conjugate (PCV-13) 3/21/2015 Tdap 3/7/2017 Not reviewed this visit You Were Diagnosed With   
  
 Codes Comments Anal lesion    -  Primary ICD-10-CM: K62.9 ICD-9-CM: 569.49 Chronic left SI joint pain     ICD-10-CM: M53.3, G89.29 ICD-9-CM: 724.6, 338.29 Vitals BP Pulse Temp Resp Height(growth percentile) Weight(growth percentile) 107/75 (BP 1 Location: Left arm, BP Patient Position: Sitting) 65 97.5 °F (36.4 °C) (Oral) 16 5' 7\" (1.702 m) 137 lb (62.1 kg) SpO2 BMI Smoking Status 98% 21.46 kg/m2 Current Every Day Smoker Vitals History BMI and BSA Data Body Mass Index Body Surface Area  
 21.46 kg/m 2 1.71 m 2 Preferred Pharmacy Pharmacy Name Phone [facility] [address] [facility], [address] [phone] Your Updated Medication List  
  
   
This list is accurate as of: 7/19/17 12:51 PM.  Always use your most recent med list.  
  
  
  
  
 ibuprofen 800 mg tablet Commonly known as:  MOTRIN  
TAKE 1 TABLET BY MOUTH EVERY 12 HOURS WITH FOOD  
  
 imiquimod 5 % cream  
Commonly known as:  ALDARA  
apply a thin layer to affected area 3x per week (Monday, Wednesday, and Friday) x 12 weeks ODEFSEY 200-25-25 mg Tab Generic drug:  tjsiajwsaw-pinghqbb-mbyndf ala  
  
 pravastatin 40 mg tablet Commonly known as:  PRAVACHOL  
  
 predniSONE 20 mg tablet Commonly known as:  Narinder Jabs Take 3 pills for 3 days, then 2 pills for 3 days, then 1 pill for 3 days  
  
 sertraline 100 mg tablet Commonly known as:  ZOLOFT  
TAKE ONE TABLET BY MOUTH ONCE DAILY  
  
 suvorexant 15 mg tablet Commonly known as:  Deysi Moll Take 1 Tab by mouth nightly as needed for Insomnia. Max Daily Amount: 15 mg.  
  
 traMADol 50 mg tablet Commonly known as:  ULTRAM  
Take 1 Tab by mouth every six (6) hours as needed for Pain. Max Daily Amount: 200 mg. Prescriptions Printed Refills  
 traMADol (ULTRAM) 50 mg tablet 0 Sig: Take 1 Tab by mouth every six (6) hours as needed for Pain. Max Daily Amount: 200 mg. Class: Print Route: Oral  
  
Prescriptions Sent to Pharmacy Refills  
 predniSONE (DELTASONE) 20 mg tablet 0 Sig: Take 3 pills for 3 days, then 2 pills for 3 days, then 1 pill for 3 days Class: Normal  
 Pharmacy: [address] #: [phone] We Performed the Following REFERRAL TO COLON AND RECTAL SURGERY [REF17 Custom] Comments:  
 Please evaluate patient for: anal lesion concerning for anal JAYNE. Follow-up Instructions Return in about 4 weeks (around 8/16/2017), or if symptoms worsen or fail to improve. Referral Information Referral ID Referred By Referred To  
  
 [id number] Yas Sutton Not Available Visits Status Start Date End Date 1 New Request 7/19/17 7/19/18 If your referral has a status of pending review or denied, additional information will be sent to support the outcome of this decision. Patient Instructions Sacroiliac Joint Pain: Care Instructions Your Care Instructions The sacroiliac joints connect the spine and each side of the pelvis. These joints bear the weight and stress of your torso. This makes them easy to injure. Injury or overuse of these joints may cause low back pain. Stress on these joints can cause joint pain. Sacroiliac joint pain is more common in pregnant women. Certain kinds of arthritis also may cause this type of joint pain. Home treatment may help you feel better. So can avoiding activities that stress your back. Your doctor also may recommend physical therapy. This may include doing exercises and stretches to help with pain. You may also learn to use good posture. Follow-up care is a key part of your treatment and safety. Be sure to make and go to all appointments, and call your doctor if you are having problems. It's also a good idea to know your test results and keep a list of the medicines you take. How can you care for yourself at home? · Ask your doctor about light exercises that may help your back pain. Try to do light activity throughout the day. But make sure to take rests as needed. Find a comfortable position for rest, but don't stay in one position for too long. Avoid activities that cause pain. · To apply heat, put a warm water bottle, a heating pad set on low, or a warm cloth on your back. Do not go to sleep with a heating pad on your skin. · Put ice or a cold pack on your back for 10 to 20 minutes at a time. Put a thin cloth between the ice and your skin. · If the doctor gave you a prescription medicine for pain, take it as prescribed.  
· If you are not taking a prescription pain medicine, ask your doctor if you can take an over-the-counter pain medicine, such as acetaminophen (Tylenol), ibuprofen (Advil, Motrin), or naproxen (Aleve). Read and follow all instructions on the label. Take pain medicines exactly as directed. · Do not take two or more pain medicines at the same time unless the doctor told you to. Many pain medicines have acetaminophen, which is Tylenol. Too much acetaminophen (Tylenol) can be harmful. · To prevent future back pain, do exercises to stretch and strengthen your back and stomach. Learn how to use good posture, safe lifting techniques, and proper body mechanics. When should you call for help? Call 911 anytime you think you may need emergency care. For example, call if: 
· You are unable to move a leg at all. Call your doctor now or seek immediate medical care if: 
· You have new or worse symptoms in your legs or buttocks. Symptoms may include: ¨ Numbness or tingling. ¨ Weakness. ¨ Pain. · You lose bladder or bowel control. Watch closely for changes in your health, and be sure to contact your doctor if: 
· You are not getting better as expected. Where can you learn more? Go to http://carmel-nicole.info/. Enter S808 in the search box to learn more about \"Sacroiliac Joint Pain: Care Instructions. \" Current as of: March 21, 2017 Content Version: 11.3 © 2996-4456 [employer]. Care instructions adapted under license by [employer] (which disclaims liability or warranty for this information). If you have questions about a medical condition or this instruction, always ask your healthcare professional. [place] [address] any warranty or liability for your use of this information. Introducing [facility]! Dear Lizett Jones: 
Thank you for requesting a Poetica account. Our records indicate that you already have an active Poetica account. You can access your account anytime at [email or web address] Did you know that you can access your hospital and ER discharge instructions at any time in Big Stage? You can also review all of your test results from your hospital stay or ER visit. Additional Information If you have questions, please visit the Frequently Asked Questions section of the Big Stage website at [email or web address] Remember, Big Stage is NOT to be used for urgent needs. For medical emergencies, dial 911. Now available from your iPhone and Android! Please provide this summary of care documentation to your next provider. Your primary care clinician is listed as Arnold White. If you have any questions after today's visit, please call [phone].

## 2017-07-19 NOTE — PROGRESS NOTES
Subjective:     Chief Complaint   Patient presents with    Anal Pain     Itching,Burning,No bleeding but yellowish greenish drainage from rectum      He  is a 40 y.o. male who presents for evaluation of: CPE    Ct to c/o rawness with itching and some fecal discharge around anus and notices anal lesion for the past 5 weeks. Did see a little bit of rectal bleeding recently. No tenesmus. Worse when sweating. Not currently sexually active but hx of receptive anal intercourse. No regular partners - not sexually active in 2-3 years. HIV well controlled and has had this since 2001. Does not think he has every been told he has HPV. No n/v, diarrhea, or constipation. No fevers. No concerns with inguinal LAD. Pain is getting worse with stools. Also reports chronic back pain in right SI joint area. Hx of this for years that comes and goes. Painful flares that generally resolved over 1 week about 1 x per year. No red flags.     ROS  Gen - no fever/chills  Resp - no dyspnea or cough  CV - no chest pain or DAVIS  Rest per HPI     Objective:     Vitals:    07/19/17 1146   BP: 107/75   Pulse: 65   Resp: 16   Temp: 97.5 °F (36.4 °C)   TempSrc: Oral   SpO2: 98%   Weight: 137 lb (62.1 kg)   Height: 5' 7\" (1.702 m)     Physical Examination:  General appearance - alert, well appearing, and in no distress  Eyes - sclera anicteric  Back - FROM, mildly ttp over R SI joint, nml sensation and strength  Rectal - see pic below            Past Medical History:   Diagnosis Date    Autoimmune disease ([address]     2001    Liver disease     Hep C treated 7/2016    Psychotic disorder 2014    Depression     Past Surgical History:   Procedure Laterality Date    HX LYMPH NODE DISSECTION Right 2001    Axilla    HX WISDOM TEETH EXTRACTION Bilateral 1995     Current Outpatient Prescriptions on File Prior to Visit   Medication Sig Dispense Refill    pravastatin (PRAVACHOL) 40 mg tablet       suvorexant (BELSOMRA) 15 mg tablet Take 1 Tab by mouth nightly as needed for Insomnia. Max Daily Amount: 15 mg. 30 Tab 5    imiquimod (ALDARA) 5 % cream apply a thin layer to affected area 3x per week (Monday, Wednesday, and Friday) x 12 weeks 36 Packet 0    sertraline (ZOLOFT) 100 mg tablet TAKE ONE TABLET BY MOUTH ONCE DAILY 30 Tab 5    ODEFSEY 200-25-25 mg tab       ibuprofen (MOTRIN) 800 mg tablet TAKE 1 TABLET BY MOUTH EVERY 12 HOURS WITH FOOD  0     No current facility-administered medications on file prior to visit. Assessment/ Plan:   Chrissy Nava was seen today for anal pain. Diagnoses and all orders for this visit:    Anal lesion- concern for anal squamous intraepithelial lesion. Started tx for condyloma in case this was anal wart but not improving and actually slightly worse so will need further w/u with bx. Set up appt with Dr. Fannie Phillips this week. -     REFERRAL TO COLON AND RECTAL SURGERY    Chronic left SI joint pain - given handout, rec daily stretching, heat, Prednisone to help with inflammation  -     predniSONE (DELTASONE) 20 mg tablet; Take 3 pills for 3 days, then 2 pills for 3 days, then 1 pill for 3 days  -     traMADol (ULTRAM) 50 mg tablet; Take 1 Tab by mouth every six (6) hours as needed for Pain. Max Daily Amount: 200 mg. I have discussed the diagnosis with the patient and the intended plan as seen in the above orders. The patient has received an after-visit summary and questions were answered concerning future plans. I have discussed medication side effects and warnings with the patient as well. The patient verbalizes understanding and agreement with the plan. Follow-up Disposition:  Return in about 4 weeks (around 8/16/2017), or if symptoms worsen or fail to improve.

## 2017-07-19 NOTE — PATIENT INSTRUCTIONS
Sacroiliac Joint Pain: Care Instructions  Your Care Instructions    The sacroiliac joints connect the spine and each side of the pelvis. These joints bear the weight and stress of your torso. This makes them easy to injure. Injury or overuse of these joints may cause low back pain. Stress on these joints can cause joint pain. Sacroiliac joint pain is more common in pregnant women. Certain kinds of arthritis also may cause this type of joint pain. Home treatment may help you feel better. So can avoiding activities that stress your back. Your doctor also may recommend physical therapy. This may include doing exercises and stretches to help with pain. You may also learn to use good posture. Follow-up care is a key part of your treatment and safety. Be sure to make and go to all appointments, and call your doctor if you are having problems. It's also a good idea to know your test results and keep a list of the medicines you take. How can you care for yourself at home? · Ask your doctor about light exercises that may help your back pain. Try to do light activity throughout the day. But make sure to take rests as needed. Find a comfortable position for rest, but don't stay in one position for too long. Avoid activities that cause pain. · To apply heat, put a warm water bottle, a heating pad set on low, or a warm cloth on your back. Do not go to sleep with a heating pad on your skin. · Put ice or a cold pack on your back for 10 to 20 minutes at a time. Put a thin cloth between the ice and your skin. · If the doctor gave you a prescription medicine for pain, take it as prescribed. · If you are not taking a prescription pain medicine, ask your doctor if you can take an over-the-counter pain medicine, such as acetaminophen (Tylenol), ibuprofen (Advil, Motrin), or naproxen (Aleve). Read and follow all instructions on the label. Take pain medicines exactly as directed.   · Do not take two or more pain medicines at the same time unless the doctor told you to. Many pain medicines have acetaminophen, which is Tylenol. Too much acetaminophen (Tylenol) can be harmful. · To prevent future back pain, do exercises to stretch and strengthen your back and stomach. Learn how to use good posture, safe lifting techniques, and proper body mechanics. When should you call for help? Call 911 anytime you think you may need emergency care. For example, call if:  · You are unable to move a leg at all. Call your doctor now or seek immediate medical care if:  · You have new or worse symptoms in your legs or buttocks. Symptoms may include:  ¨ Numbness or tingling. ¨ Weakness. ¨ Pain. · You lose bladder or bowel control. Watch closely for changes in your health, and be sure to contact your doctor if:  · You are not getting better as expected. Where can you learn more? Go to http://carmel-nicole.info/. Enter I061 in the search box to learn more about \"Sacroiliac Joint Pain: Care Instructions. \"  Current as of: March 21, 2017  Content Version: 11.3  © 9503-8092 [employer]. Care instructions adapted under license by [employer] (which disclaims liability or warranty for this information). If you have questions about a medical condition or this instruction, always ask your healthcare professional. [place] [address] any warranty or liability for your use of this information.

## 2017-07-21 ENCOUNTER — TELEPHONE (OUTPATIENT)
Dept: FAMILY MEDICINE CLINIC | Age: 47
End: 2017-07-21

## 2017-07-21 NOTE — TELEPHONE ENCOUNTER
Pls call pt   Dr. Patricia Montenegro at [address] [place] [address] would not see him today   Referral shows todays date but they said it was for next week   He states he is on vacation next week     Pls give him a call at [phone]

## 2017-07-21 NOTE — TELEPHONE ENCOUNTER
Patient states when he arrived to office today @ [facility] ,no record of appointment that was scheduled for today. He is on vacation next week .  Will call and reschedule appointment and then call referral desk regarding referral.

## 2017-07-24 NOTE — TELEPHONE ENCOUNTER
[facility]     He has an apptmnt w/another MD Dr. Hernandez on 7/31/17

## 2017-08-15 ENCOUNTER — OFFICE VISIT (OUTPATIENT)
Dept: FAMILY MEDICINE CLINIC | Age: 47
End: 2017-08-15

## 2017-08-15 VITALS
TEMPERATURE: 96.8 F | DIASTOLIC BLOOD PRESSURE: 61 MMHG | SYSTOLIC BLOOD PRESSURE: 95 MMHG | RESPIRATION RATE: 16 BRPM | BODY MASS INDEX: 21.35 KG/M2 | HEIGHT: 67 IN | WEIGHT: 136 LBS | OXYGEN SATURATION: 98 % | HEART RATE: 74 BPM

## 2017-08-15 DIAGNOSIS — K62.9 ANAL LESION: Primary | ICD-10-CM

## 2017-08-15 NOTE — PROGRESS NOTES
Chief Complaint   Patient presents with    Follow-up     Rectal bleeding   Saw Dr Shaheed Love on 7/31/17 surgery 8/3/17  Waiting on biopsy report  Room 2

## 2017-08-15 NOTE — PROGRESS NOTES
Subjective:     Chief Complaint   Patient presents with    Follow-up     Rectal bleeding      He  is a 56 y.o. male who presents for evaluation of:     F/u for anal lesion -saw Dr. Bre Billings and just thought to be 3 anal warts. Had these surgically removed. He is still in pain but doing much better overall. Awaiting pathology and patient to see Dr. Helen Juares on Monday for postop follow-up. Labs reviewed and PSA was normal.    ROS  Gen - no fever/chills  Resp - no dyspnea or cough  CV - no chest pain or DAVIS  Rest per HPI     Objective:     Vitals:    08/15/17 0848   BP: 95/61   Pulse: 74   Resp: 16   Temp: 96.8 °F (36 °C)   TempSrc: Oral   SpO2: 98%   Weight: 136 lb (61.7 kg)   Height: 5' 7\" (1.702 m)     Physical Examination:  General appearance - alert, well appearing, and in no distress  Eyes -sclera anicteric  Chest - clear to auscultation, no wheezes, rales or rhonchi, symmetric air entry  Heart - normal rate, regular rhythm, normal S1, S2, no murmurs, rubs, clicks or gallops  Rectal - deferred since seeing Dr. Helen Juares for this    Past Medical History:   Diagnosis Date    Autoimmune disease ([address]     2001    Liver disease     Hep C treated 7/2016    Psychotic disorder 2014    Depression     Past Surgical History:   Procedure Laterality Date    HX LYMPH NODE DISSECTION Right 2001    Axilla    HX WISDOM TEETH EXTRACTION Bilateral 1995     Current Outpatient Prescriptions on File Prior to Visit   Medication Sig Dispense Refill    pravastatin (PRAVACHOL) 40 mg tablet       suvorexant (BELSOMRA) 15 mg tablet Take 1 Tab by mouth nightly as needed for Insomnia. Max Daily Amount: 15 mg. 30 Tab 5    sertraline (ZOLOFT) 100 mg tablet TAKE ONE TABLET BY MOUTH ONCE DAILY 30 Tab 5    ODEFSEY 200-25-25 mg tab       ibuprofen (MOTRIN) 800 mg tablet TAKE 1 TABLET BY MOUTH EVERY 12 HOURS WITH FOOD  0    traMADol (ULTRAM) 50 mg tablet Take 1 Tab by mouth every six (6) hours as needed for Pain.  Max Daily Amount: 200 mg. 30 Tab 0    imiquimod (ALDARA) 5 % cream apply a thin layer to affected area 3x per week (Monday, Wednesday, and Friday) x 12 weeks 36 Packet 0     No current facility-administered medications on file prior to visit. Assessment/ Plan:   Johnnie Keane was seen today for anal pain. Diagnoses and all orders for this visit:    Anal lesion-seen by Dr. Lyndsey Manning and thought to be 3 anal warts. He is status post surgical removal and has follow-up with Dr. Lyndsey Manning on Monday. Awaiting pathology. I have discussed the diagnosis with the patient and the intended plan as seen in the above orders. The patient has received an after-visit summary and questions were answered concerning future plans. I have discussed medication side effects and warnings with the patient as well. The patient verbalizes understanding and agreement with the plan.     Follow-up Disposition: Not on File

## 2017-08-22 ENCOUNTER — OFFICE VISIT (OUTPATIENT)
Dept: FAMILY MEDICINE CLINIC | Age: 47
End: 2017-08-22

## 2017-08-22 ENCOUNTER — TELEPHONE (OUTPATIENT)
Dept: FAMILY MEDICINE CLINIC | Age: 47
End: 2017-08-22

## 2017-08-22 VITALS
TEMPERATURE: 98.6 F | RESPIRATION RATE: 16 BRPM | OXYGEN SATURATION: 96 % | HEIGHT: 67 IN | BODY MASS INDEX: 21.03 KG/M2 | DIASTOLIC BLOOD PRESSURE: 65 MMHG | WEIGHT: 134 LBS | HEART RATE: 87 BPM | SYSTOLIC BLOOD PRESSURE: 94 MMHG

## 2017-08-22 DIAGNOSIS — J02.0 PHARYNGITIS DUE TO STREPTOCOCCUS SPECIES: Primary | ICD-10-CM

## 2017-08-22 DIAGNOSIS — D84.9 IMMUNOSUPPRESSED STATUS (HCC): ICD-10-CM

## 2017-08-22 DIAGNOSIS — R50.9 FEVER AND CHILLS: ICD-10-CM

## 2017-08-22 LAB
S PYO AG THROAT QL: NEGATIVE
VALID INTERNAL CONTROL?: YES

## 2017-08-22 RX ORDER — AMOXICILLIN AND CLAVULANATE POTASSIUM 875; 125 MG/1; MG/1
1 TABLET, FILM COATED ORAL 2 TIMES DAILY
Qty: 20 TAB | Refills: 0 | Status: SHIPPED | OUTPATIENT
Start: 2017-08-22 | End: 2017-09-01

## 2017-08-22 NOTE — TELEPHONE ENCOUNTER
Spoke with patient and advised to come to [facility] office now for appointment with Dr Braxton Peterson.

## 2017-08-22 NOTE — PROGRESS NOTES
Chief Complaint   Patient presents with    Sore Throat     since Saturday   Fever and chills since Saturday  Room 4

## 2017-08-22 NOTE — TELEPHONE ENCOUNTER
Pt reports sore throat, night sweats, tired x 4 days   Doesn't seem to be getting better     Pls call to advise     Best number to reach him is [phone]

## 2017-08-22 NOTE — PROGRESS NOTES
Subjective:     Chief Complaint   Patient presents with    Sore Throat     since Saturday      He  is a 56y.o. year old male who presents for evaluation of URI    Upper Respiratory Infection  Patient complains of symptoms of a URI. Symptoms include sore throat, swollen glands, fever and cough. Onset of symptoms was 4 days ago, unchanged since that time. He is drinking plenty of fluids. Evaluation to date: ibuprofen. Treatment to date: none.     ROS:  Constitutional: per HPI  Eyes: negative for visual disturbance  Ears, nose, mouth, throat, and face: per HPI  Respiratory: per HPI  Cardiovascular: negative for chest pain, dyspnea, palpitations  Gastrointestinal: negative for nausea, vomiting, diarrhea and abdominal pain  Integument/breast: negative for rash    Objective:     Vitals:    08/22/17 1135   BP: 94/65   Pulse: 87   Resp: 16   Temp: 98.6 °F (37 °C)   TempSrc: Oral   SpO2: 96%   Weight: 134 lb (60.8 kg)   Height: 5' 7\" (1.702 m)       Physical Examination:   Gen - NAD  Eyes - sclera anicteric  ENT - turbinates inflamed bilat, no sinus tenderness, post pharynx erythematous with exudate, tender submandib LAD  Resp - CTAB, no w/r/r  CV - rrr, no m/r/g    Allergies   Allergen Reactions    Abacavir Other (comments)     Interaction with other medication      Social History     Social History    Marital status: SINGLE     Spouse name: N/A    Number of children: N/A    Years of education: N/A     Social History Main Topics    Smoking status: Current Every Day Smoker     Packs/day: 1.00     Years: 30.00     Types: Cigarettes    Smokeless tobacco: Never Used    Alcohol use Yes      Comment: rarely    Drug use: No    Sexual activity: Not Currently     Partners: Male     Birth control/ protection: None     Other Topics Concern    None     Social History Narrative      Family History   Problem Relation Age of Onset    No Known Problems Mother     No Known Problems Father     No Known Problems Sister Past Surgical History:   Procedure Laterality Date    HX LYMPH NODE DISSECTION Right 2001    Axilla    HX WISDOM TEETH EXTRACTION Bilateral 1995      Past Medical History:   Diagnosis Date    Autoimmune disease ([facility]     2001    Liver disease     Hep C treated 7/2016    Psychotic disorder 2014    Depression      Current Outpatient Prescriptions   Medication Sig Dispense Refill    amoxicillin-clavulanate (AUGMENTIN) 875-125 mg per tablet Take 1 Tab by mouth two (2) times a day for 10 days. 20 Tab 0    pravastatin (PRAVACHOL) 40 mg tablet       suvorexant (BELSOMRA) 15 mg tablet Take 1 Tab by mouth nightly as needed for Insomnia. Max Daily Amount: 15 mg. 30 Tab 5    sertraline (ZOLOFT) 100 mg tablet TAKE ONE TABLET BY MOUTH ONCE DAILY 30 Tab 5    ODEFSEY 200-25-25 mg tab       ibuprofen (MOTRIN) 800 mg tablet TAKE 1 TABLET BY MOUTH EVERY 12 HOURS WITH FOOD  0    traMADol (ULTRAM) 50 mg tablet Take 1 Tab by mouth every six (6) hours as needed for Pain. Max Daily Amount: 200 mg. 30 Tab 0    imiquimod (ALDARA) 5 % cream apply a thin layer to affected area 3x per week (Monday, Wednesday, and Friday) x 12 weeks 36 Packet 0        Assessment/ Plan:   Diagnoses and all orders for this visit:    1. Pharyngitis due to Streptococcus species  2. Fever and chills  3. Immunosuppressed status ([facility]  - Will cover for strep given immunosuppressed hx and exam.  -     AMB POC RAPID STREP A  -     amoxicillin-clavulanate (AUGMENTIN) 875-125 mg per tablet; Take 1 Tab by mouth two (2) times a day for 10 days. I have discussed the diagnosis with the patient and the intended plan as seen in the above orders. The patient has received an after-visit summary and questions were answered concerning future plans. I have discussed medication side effects and warnings with the patient as well. The patient verbalizes understanding and agreement with the plan.     Follow-up Disposition:  Return if symptoms worsen or fail to improve.

## 2017-08-22 NOTE — LETTER
NOTIFICATION RETURN TO WORK / SCHOOL 
 
8/22/2017 12:02 PM 
 
[facility] [address] [address] To Whom It May Concern: 
 
Jade Arita is currently under the care of Elizabeth Agudelo. He will return to work/school on: 8/24/17 If there are questions or concerns please have the patient contact our office.  
 
 
 
Sincerely, 
 
 
Eloisa Krishnamurthy MD

## 2017-08-22 NOTE — MR AVS SNAPSHOT
Visit Information Date & Time Provider Department Dept. Phone Encounter #  
 8/22/2017 11:30 AM Rosio Kapoor MD [facility] at [address] [id number] Follow-up Instructions Return if symptoms worsen or fail to improve. Upcoming Health Maintenance Date Due INFLUENZA AGE 9 TO ADULT 8/1/2017 Pneumococcal 19-64 Highest Risk (3 of 3 - PPSV23) 7/11/2022 DTaP/Tdap/Td series (2 - Td) 3/7/2027 Allergies as of 8/22/2017  Review Complete On: 8/22/2017 By: Rosio Kapoor MD  
  
 Severity Noted Reaction Type Reactions Abacavir High 03/07/2017    Other (comments) Interaction with other medication Current Immunizations  Never Reviewed Name Date Influenza Vaccine 10/20/2016 Pneumococcal Conjugate (PCV-13) 3/21/2015 Tdap 3/7/2017 Not reviewed this visit You Were Diagnosed With   
  
 Codes Comments Pharyngitis due to Streptococcus species    -  Primary ICD-10-CM: J02.0 ICD-9-CM: 034.0 Fever and chills     ICD-10-CM: R50.9 ICD-9-CM: 780.60 Immunosuppressed status ([facility]     ICD-10-CM: D89.9 ICD-9-CM: 279.9 Vitals BP Pulse Temp Resp Height(growth percentile) Weight(growth percentile) 94/65 (BP 1 Location: Right arm, BP Patient Position: Sitting) 87 98.6 °F (37 °C) (Oral) 16 5' 7\" (1.702 m) 134 lb (60.8 kg) SpO2 BMI Smoking Status 96% 20.99 kg/m2 Current Every Day Smoker BMI and BSA Data Body Mass Index Body Surface Area  
 20.99 kg/m 2 1.7 m 2 Preferred Pharmacy Pharmacy Name Phone [facility] [address] [facility], [address] [phone] Your Updated Medication List  
  
   
This list is accurate as of: 8/22/17 12:02 PM.  Always use your most recent med list.  
  
  
  
  
 amoxicillin-clavulanate 875-125 mg per tablet Commonly known as:  AUGMENTIN Take 1 Tab by mouth two (2) times a day for 10 days. ibuprofen 800 mg tablet Commonly known as:  MOTRIN  
TAKE 1 TABLET BY MOUTH EVERY 12 HOURS WITH FOOD  
  
 imiquimod 5 % cream  
Commonly known as:  ALDARA  
apply a thin layer to affected area 3x per week (Monday, Wednesday, and Friday) x 12 weeks ODEFSEY 200-25-25 mg Tab Generic drug:  lqonkkkxrn-jvjqwrtb-axptis ala  
  
 pravastatin 40 mg tablet Commonly known as:  PRAVACHOL  
  
 sertraline 100 mg tablet Commonly known as:  ZOLOFT  
TAKE ONE TABLET BY MOUTH ONCE DAILY  
  
 suvorexant 15 mg tablet Commonly known as:  Aspen Juniper Take 1 Tab by mouth nightly as needed for Insomnia. Max Daily Amount: 15 mg.  
  
 traMADol 50 mg tablet Commonly known as:  ULTRAM  
Take 1 Tab by mouth every six (6) hours as needed for Pain. Max Daily Amount: 200 mg. Prescriptions Sent to Pharmacy Refills  
 amoxicillin-clavulanate (AUGMENTIN) 875-125 mg per tablet 0 Sig: Take 1 Tab by mouth two (2) times a day for 10 days. Class: Normal  
 Pharmacy: [address] #: [phone] Route: Oral  
  
We Performed the Following AMB POC RAPID STREP A [90320 CPT(R)] Follow-up Instructions Return if symptoms worsen or fail to improve. Introducing [facility]! Dear Nelsy Larios: 
Thank you for requesting a [employer] account. Our records indicate that you already have an active [employer] account. You can access your account anytime at [email or web address] Did you know that you can access your hospital and ER discharge instructions at any time in [employer]? You can also review all of your test results from your hospital stay or ER visit. Additional Information If you have questions, please visit the Frequently Asked Questions section of the [employer] website at [email or web address] Remember, [employer] is NOT to be used for urgent needs. For medical emergencies, dial 911. Now available from your iPhone and Android! Please provide this summary of care documentation to your next provider. Your primary care clinician is listed as Jacob Warren. If you have any questions after today's visit, please call [phone].

## 2017-12-07 ENCOUNTER — CLINICAL SUPPORT (OUTPATIENT)
Dept: FAMILY MEDICINE CLINIC | Age: 47
End: 2017-12-07

## 2017-12-07 DIAGNOSIS — Z23 ENCOUNTER FOR IMMUNIZATION: Primary | ICD-10-CM

## 2017-12-20 ENCOUNTER — TELEPHONE (OUTPATIENT)
Dept: FAMILY MEDICINE CLINIC | Age: 47
End: 2017-12-20

## 2017-12-20 NOTE — TELEPHONE ENCOUNTER
Pls call patient in ref to [facility] message from yesterday     Best number to reach him is [phone]

## 2017-12-20 NOTE — TELEPHONE ENCOUNTER
Patient advised that earliest appointment would be first of year. [facility] for evaluation of  Back pain, near/around left shoulder blade.  Pain is getting worse. Having  headaches and can not sleep. Been taking ibuprofen, not working. The pain started about 3-4 weeks ago. Patient in agreement to go to Urgent Care.

## 2017-12-21 ENCOUNTER — HOSPITAL ENCOUNTER (EMERGENCY)
Age: 47
Discharge: HOME | End: 2017-12-21
Attending: FAMILY MEDICINE

## 2017-12-21 VITALS
HEART RATE: 90 BPM | TEMPERATURE: 97.8 F | OXYGEN SATURATION: 98 % | WEIGHT: 141 LBS | DIASTOLIC BLOOD PRESSURE: 70 MMHG | BODY MASS INDEX: 22.13 KG/M2 | SYSTOLIC BLOOD PRESSURE: 122 MMHG | RESPIRATION RATE: 16 BRPM | HEIGHT: 67 IN

## 2017-12-21 DIAGNOSIS — M62.838 MUSCLE SPASMS OF NECK: Primary | ICD-10-CM

## 2017-12-21 DIAGNOSIS — M62.838 TRAPEZIUS MUSCLE SPASM: ICD-10-CM

## 2017-12-21 RX ORDER — NAPROXEN 375 MG/1
375 TABLET ORAL 2 TIMES DAILY WITH MEALS
Qty: 14 TAB | Refills: 0 | Status: SHIPPED | OUTPATIENT
Start: 2017-12-21 | End: 2017-12-28

## 2017-12-21 RX ORDER — METHOCARBAMOL 500 MG/1
500 TABLET, FILM COATED ORAL 3 TIMES DAILY
Qty: 9 TAB | Refills: 0 | Status: SHIPPED | OUTPATIENT
Start: 2017-12-21 | End: 2017-12-24

## 2017-12-21 NOTE — UC PROVIDER NOTE
HPI Comments:     2 week history of neck \"tightness\"  Pain 5/10. No radiating. Worse Left than right worse with turning head to right. Has had similar symptoms other side of neck in past that were identical. [facility] PCP did trigger point injections that resolved issue. Has not taken any medications for relief. Hot shower helped temporarily. No other associated symptoms. Overall not improved. Otherwise feels well. Hx significant for HIV. [facility] undetectable viral loads no concerning CD4 counts. Last checked in October. Denies: fever, chills, weight loss, night sweats, numbness, extremity weakness, abdominal pain. Patient is a 56 y.o. male presenting with back pain. Back Pain    Pertinent negatives include no fever, no abdominal pain and no weakness. Past Medical History:   Diagnosis Date    Autoimmune disease ([facility] [address]     2001    Liver disease     Hep C treated 7/2016    Psychotic disorder 2014    Depression        Past Surgical History:   Procedure Laterality Date    HX LYMPH NODE DISSECTION Right 2001    Axilla    HX WISDOM TEETH EXTRACTION Bilateral 1995         Family History   Problem Relation Age of Onset    No Known Problems Mother     No Known Problems Father     No Known Problems Sister         Social History     Social History    Marital status: SINGLE     Spouse name: N/A    Number of children: N/A    Years of education: N/A     Occupational History    Not on file.      Social History Main Topics    Smoking status: Current Every Day Smoker     Packs/day: 1.00     Years: 30.00     Types: Cigarettes    Smokeless tobacco: Never Used    Alcohol use Yes      Comment: rarely    Drug use: No    Sexual activity: Not Currently     Partners: Male     Birth control/ protection: None     Other Topics Concern    Not on file     Social History Narrative                ALLERGIES: Abacavir    Review of Systems   Constitutional: Negative for appetite change, chills, diaphoresis, fatigue and fever. Eyes: Negative for redness. Gastrointestinal: Negative for abdominal pain, anal bleeding, nausea and vomiting. Genitourinary: Negative for flank pain. Musculoskeletal: Positive for neck pain. Negative for back pain and myalgias. Neurological: Negative for dizziness and weakness. Vitals:    12/21/17 1103   BP: 122/70   Pulse: 90   Resp: 16   Temp: 97.8 °F (36.6 °C)   SpO2: 98%   Weight: 64 kg (141 lb)   Height: 5' 7\" (1.702 m)       Physical Exam   Constitutional: He is oriented to person, place, and time. No distress. Does not appear ill   HENT:   Mouth/Throat: Oropharynx is clear and moist.   Eyes: Conjunctivae and EOM are normal. Pupils are equal, round, and reactive to light. Neck: Neck supple. Cardiovascular: Normal rate, regular rhythm and normal heart sounds. Exam reveals no gallop and no friction rub. Pulmonary/Chest: Effort normal and breath sounds normal. No respiratory distress. He has no wheezes. He has no rales. Musculoskeletal:        Back:    Palpable trapezius spasm location (see diagram)  Pain with palpation at that location. No midline spinal pain. Upper extremity strength 5/5. Normal sensation and cap refill < 2 sec upper extremitates. Mild pain with head turning R>L. Lymphadenopathy:     He has no cervical adenopathy. Neurological: He is alert and oriented to person, place, and time. Skin: Skin is warm and dry. No rash noted. No erythema. No pallor. Psychiatric: He has a normal mood and affect. His behavior is normal. Thought content normal.       MDM     Differential Diagnosis; Clinical Impression; Plan:       CLINICAL IMPRESSION:  (G78.564) Muscle spasms of neck  (primary encounter diagnosis)  (M09.046) Trapezius muscle spasm    Orders Placed This Encounter      methocarbamol (ROBAXIN) 500 mg tablet      naproxen (NAPROSYN) 375 mg tablet    Plan:  1. Should hold sleep medication while on muscle relaxant as they may intensify effect.  He is aware of warnings SE/AE and how to take properly in safety of his home. 2. Perform neck stretches on handout. Warm compresses  3. Follow up with PCP without improvement over the next week. Immediate follow up for new or worsening.     Risk of Significant Complications, Morbidity, and/or Mortality:   Presenting problems:  Low  Diagnostic procedures:  Low  Management options:  Low  Progress:   Patient progress:  Stable      Procedures

## 2017-12-21 NOTE — DISCHARGE INSTRUCTIONS
Methocarbamol (By mouth)   Methocarbamol (meth-oh-CHRISTIANO-ba-mol)  Treats muscle pain and spasms. Brand Name(s): Robaxin, Robaxin-750   There may be other brand names for this medicine. When This Medicine Should Not Be Used: This medicine is not right for everyone. Do not use it if you had an allergic reaction to methocarbamol. How to Use This Medicine:   Tablet  · Take your medicine as directed. Your dose may need to be changed several times to find what works best for you. · Missed dose: Take a dose as soon as you remember. If it is almost time for your next dose, wait until then and take a regular dose. Do not take extra medicine to make up for a missed dose. · Store the medicine in a closed container at room temperature, away from heat, moisture, and direct light. Drugs and Foods to Avoid:   Ask your doctor or pharmacist before using any other medicine, including over-the-counter medicines, vitamins, and herbal products. · Some medicines can affect how methocarbamol works. Tell your doctor if you are using pyridostigmine. · Do not drink alcohol while you are using this medicine. · Tell your doctor if you use anything else that makes you sleepy. Some examples are allergy medicine, narcotic pain medicine, and alcohol. Warnings While Using This Medicine:   · Tell your doctor if you are pregnant or breastfeeding, or if you have kidney disease, liver disease, or myasthenia gravis. · This medicine may make you dizzy or drowsy. Do not drive or do anything that could be dangerous until you know how this medicine affects you. · Tell any doctor or dentist who treats you that you are using this medicine. This medicine may affect certain medical test results. · Your doctor will check your progress and the effects of this medicine at regular visits. Keep all appointments. · Keep all medicine out of the reach of children. Never share your medicine with anyone.   Possible Side Effects While Using This Medicine: Call your doctor right away if you notice any of these side effects:  · Allergic reaction: Itching or hives, swelling in your face or hands, swelling or tingling in your mouth or throat, chest tightness, trouble breathing  · Blurred vision, redness, pain, or swelling of the eyes  · Dark urine or pale stools, nausea, vomiting, loss of appetite, stomach pain, yellow skin or eyes  · Fever  · Lightheadedness, dizziness, fainting  · Seizures  · Slow heartbeat  · Unusual bleeding, bruising, or weakness  If you notice these less serious side effects, talk with your doctor:   · Confusion, trouble sleeping  · Headache  · Warmth or redness in your face, neck, arms, or upper chest  If you notice other side effects that you think are caused by this medicine, tell your doctor. Call your doctor for medical advice about side effects. You may report side effects to FDA at 7-996-FDA-3903  © 2017 [facility] Information is for End User's use only and may not be sold, redistributed or otherwise used for commercial purposes. The above information is an  only. It is not intended as medical advice for individual conditions or treatments. Talk to your doctor, nurse or pharmacist before following any medical regimen to see if it is safe and effective for you. Neck Spasm: Exercises  Your Care Instructions  Here are some examples of typical rehabilitation exercises for your condition. Start each exercise slowly. Ease off the exercise if you start to have pain. Your doctor or physical therapist will tell you when you can start these exercises and which ones will work best for you. How to do the exercises  Levator scapula stretch    1. Sit in a firm chair, or stand up straight. 2. Gently tilt your head toward your left shoulder. 3. Turn your head to look down into your armpit, bending your head slightly forward. Let the weight of your head stretch your neck muscles.   4. Hold for 15 to 30 seconds. 5. Return to your starting position. 6. Follow the same instructions above, but tilt your head toward your right shoulder. 7. Repeat 2 to 4 times toward each shoulder. Upper trapezius stretch    1. Sit in a firm chair, or stand up straight. 2. This stretch works best if you keep your shoulder down as you lean away from it. To help you remember to do this, start by relaxing your shoulders and lightly holding on to your thighs or your chair. 3. Tilt your head toward your shoulder and hold for 15 to 30 seconds. Let the weight of your head stretch your muscles. 4. If you would like a little added stretch, place your arm behind your back. Use the arm opposite of the direction you are tilting your head. For example, if you are tilting your head to the left, place your right arm behind your back. 5. Repeat 2 to 4 times toward each shoulder. Neck rotation    1. Sit in a firm chair, or stand up straight. 2. Keeping your chin level, turn your head to the right, and hold for 15 to 30 seconds. 3. Turn your head to the left, and hold for 15 to 30 seconds. 4. Repeat 2 to 4 times to each side. Chin tuck    1. Lie on the floor with a rolled-up towel under your neck. Your head should be touching the floor. 2. Slowly bring your chin toward the front of your neck. 3. Hold for a count of 6, and then relax for up to 10 seconds. 4. Repeat 8 to 12 times. Forward neck flexion    1. Sit in a firm chair, or stand up straight. 2. Bend your head forward. 3. Hold for 15 to 30 seconds, then return to your starting position. 4. Repeat 2 to 4 times. Follow-up care is a key part of your treatment and safety. Be sure to make and go to all appointments, and call your doctor if you are having problems. It's also a good idea to know your test results and keep a list of the medicines you take. Where can you learn more? Go to http://carmel-nicole.info/.   Enter P962 in the search box to learn more about \"Neck Spasm: Exercises. \"  Current as of: March 21, 2017  Content Version: 11.4  © 6849-5078 [employer], [employer]. Care instructions adapted under license by [employer] (which disclaims liability or warranty for this information). If you have questions about a medical condition or this instruction, always ask your healthcare professional. [place] [address] any warranty or liability for your use of this information.

## 2019-03-29 ENCOUNTER — APPOINTMENT (OUTPATIENT)
Dept: GENERAL RADIOLOGY | Age: 49
End: 2019-03-29
Attending: EMERGENCY MEDICINE
Payer: MEDICAID

## 2019-03-29 ENCOUNTER — HOSPITAL ENCOUNTER (EMERGENCY)
Age: 49
Discharge: HOME | End: 2019-03-29
Attending: EMERGENCY MEDICINE
Payer: MEDICAID

## 2019-03-29 VITALS
TEMPERATURE: 98.4 F | HEIGHT: 67 IN | BODY MASS INDEX: 21.82 KG/M2 | HEART RATE: 76 BPM | SYSTOLIC BLOOD PRESSURE: 131 MMHG | DIASTOLIC BLOOD PRESSURE: 90 MMHG | OXYGEN SATURATION: 100 % | RESPIRATION RATE: 18 BRPM | WEIGHT: 139 LBS

## 2019-03-29 DIAGNOSIS — M50.30 DEGENERATIVE DISC DISEASE, CERVICAL: Primary | ICD-10-CM

## 2019-03-29 DIAGNOSIS — R07.89 MUSCULOSKELETAL CHEST PAIN: ICD-10-CM

## 2019-03-29 DIAGNOSIS — R20.2 PARESTHESIA AND PAIN OF LEFT EXTREMITY: ICD-10-CM

## 2019-03-29 DIAGNOSIS — M79.609 PARESTHESIA AND PAIN OF LEFT EXTREMITY: ICD-10-CM

## 2019-03-29 LAB
ALBUMIN SERPL-MCNC: 3.7 G/DL (ref 3.5–5)
ALBUMIN/GLOB SERPL: 1.1 {RATIO} (ref 1.1–2.2)
ALP SERPL-CCNC: 77 U/L (ref 45–117)
ALT SERPL-CCNC: 18 U/L (ref 12–78)
ANION GAP SERPL CALC-SCNC: 6 MMOL/L (ref 5–15)
AST SERPL-CCNC: 15 U/L (ref 15–37)
BASOPHILS # BLD: 0 K/UL (ref 0–0.1)
BASOPHILS NFR BLD: 0 % (ref 0–1)
BILIRUB SERPL-MCNC: 0.4 MG/DL (ref 0.2–1)
BUN SERPL-MCNC: 10 MG/DL (ref 6–20)
BUN/CREAT SERPL: 9 (ref 12–20)
CALCIUM SERPL-MCNC: 8.4 MG/DL (ref 8.5–10.1)
CHLORIDE SERPL-SCNC: 110 MMOL/L (ref 97–108)
CO2 SERPL-SCNC: 26 MMOL/L (ref 21–32)
CREAT SERPL-MCNC: 1.12 MG/DL (ref 0.7–1.3)
DIFFERENTIAL METHOD BLD: NORMAL
EOSINOPHIL # BLD: 0.1 K/UL (ref 0–0.4)
EOSINOPHIL NFR BLD: 1 % (ref 0–7)
ERYTHROCYTE [DISTWIDTH] IN BLOOD BY AUTOMATED COUNT: 13.8 % (ref 11.5–14.5)
GLOBULIN SER CALC-MCNC: 3.5 G/DL (ref 2–4)
GLUCOSE SERPL-MCNC: 86 MG/DL (ref 65–100)
HCT VFR BLD AUTO: 46 % (ref 36.6–50.3)
HGB BLD-MCNC: 16.2 G/DL (ref 12.1–17)
IMM GRANULOCYTES # BLD AUTO: 0 K/UL (ref 0–0.04)
IMM GRANULOCYTES NFR BLD AUTO: 0 % (ref 0–0.5)
LYMPHOCYTES # BLD: 2.5 K/UL (ref 0.8–3.5)
LYMPHOCYTES NFR BLD: 39 % (ref 12–49)
MCH RBC QN AUTO: 32.7 PG (ref 26–34)
MCHC RBC AUTO-ENTMCNC: 35.2 G/DL (ref 30–36.5)
MCV RBC AUTO: 92.9 FL (ref 80–99)
MONOCYTES # BLD: 0.5 K/UL (ref 0–1)
MONOCYTES NFR BLD: 8 % (ref 5–13)
NEUTS SEG # BLD: 3.3 K/UL (ref 1.8–8)
NEUTS SEG NFR BLD: 52 % (ref 32–75)
NRBC # BLD: 0 K/UL (ref 0–0.01)
NRBC BLD-RTO: 0 PER 100 WBC
PLATELET # BLD AUTO: 179 K/UL (ref 150–400)
PMV BLD AUTO: 9.4 FL (ref 8.9–12.9)
POTASSIUM SERPL-SCNC: 3.6 MMOL/L (ref 3.5–5.1)
PROT SERPL-MCNC: 7.2 G/DL (ref 6.4–8.2)
RBC # BLD AUTO: 4.95 M/UL (ref 4.1–5.7)
SODIUM SERPL-SCNC: 142 MMOL/L (ref 136–145)
TROPONIN I SERPL-MCNC: <0.05 NG/ML
WBC # BLD AUTO: 6.5 K/UL (ref 4.1–11.1)

## 2019-03-29 PROCEDURE — 85025 COMPLETE CBC W/AUTO DIFF WBC: CPT

## 2019-03-29 PROCEDURE — 75810000123 HC INJ'S ANES/STEROID AGT PERIPH NERVE

## 2019-03-29 PROCEDURE — 74011000250 HC RX REV CODE- 250: Performed by: PHYSICIAN ASSISTANT

## 2019-03-29 PROCEDURE — 36415 COLL VENOUS BLD VENIPUNCTURE: CPT

## 2019-03-29 PROCEDURE — 74011636637 HC RX REV CODE- 636/637: Performed by: PHYSICIAN ASSISTANT

## 2019-03-29 PROCEDURE — 84484 ASSAY OF TROPONIN QUANT: CPT

## 2019-03-29 PROCEDURE — 71046 X-RAY EXAM CHEST 2 VIEWS: CPT

## 2019-03-29 PROCEDURE — 80053 COMPREHEN METABOLIC PANEL: CPT

## 2019-03-29 PROCEDURE — 74011250637 HC RX REV CODE- 250/637: Performed by: PHYSICIAN ASSISTANT

## 2019-03-29 PROCEDURE — 99284 EMERGENCY DEPT VISIT MOD MDM: CPT

## 2019-03-29 PROCEDURE — 93005 ELECTROCARDIOGRAM TRACING: CPT

## 2019-03-29 RX ORDER — ACETAMINOPHEN 325 MG/1
650 TABLET ORAL
Status: COMPLETED | OUTPATIENT
Start: 2019-03-29 | End: 2019-03-29

## 2019-03-29 RX ORDER — PREDNISONE 20 MG/1
60 TABLET ORAL DAILY
Qty: 15 TAB | Refills: 0 | Status: SHIPPED | OUTPATIENT
Start: 2019-03-29 | End: 2019-04-03

## 2019-03-29 RX ORDER — METHOCARBAMOL 750 MG/1
750 TABLET, FILM COATED ORAL
Status: COMPLETED | OUTPATIENT
Start: 2019-03-29 | End: 2019-03-29

## 2019-03-29 RX ORDER — SODIUM CHLORIDE 0.9 % (FLUSH) 0.9 %
5-40 SYRINGE (ML) INJECTION EVERY 8 HOURS
Status: DISCONTINUED | OUTPATIENT
Start: 2019-03-29 | End: 2019-03-29 | Stop reason: HOSPADM

## 2019-03-29 RX ORDER — ACETAMINOPHEN 325 MG/1
650 TABLET ORAL
Qty: 20 TAB | Refills: 0 | Status: SHIPPED | OUTPATIENT
Start: 2019-03-29 | End: 2022-02-23 | Stop reason: ALTCHOICE

## 2019-03-29 RX ORDER — GABAPENTIN 300 MG/1
300 CAPSULE ORAL 3 TIMES DAILY
COMMUNITY
End: 2019-04-04 | Stop reason: SDUPTHER

## 2019-03-29 RX ORDER — TRAZODONE HYDROCHLORIDE 50 MG/1
50 TABLET ORAL
COMMUNITY
End: 2019-04-04 | Stop reason: SDUPTHER

## 2019-03-29 RX ORDER — METHOCARBAMOL 750 MG/1
750 TABLET, FILM COATED ORAL 3 TIMES DAILY
Qty: 20 TAB | Refills: 0 | Status: SHIPPED | OUTPATIENT
Start: 2019-03-29 | End: 2019-05-09

## 2019-03-29 RX ORDER — PREDNISONE 20 MG/1
60 TABLET ORAL
Status: COMPLETED | OUTPATIENT
Start: 2019-03-29 | End: 2019-03-29

## 2019-03-29 RX ORDER — BUPIVACAINE HYDROCHLORIDE 5 MG/ML
1 INJECTION, SOLUTION EPIDURAL; INTRACAUDAL
Status: COMPLETED | OUTPATIENT
Start: 2019-03-29 | End: 2019-03-29

## 2019-03-29 RX ORDER — SODIUM CHLORIDE 0.9 % (FLUSH) 0.9 %
5-40 SYRINGE (ML) INJECTION AS NEEDED
Status: DISCONTINUED | OUTPATIENT
Start: 2019-03-29 | End: 2019-03-29 | Stop reason: HOSPADM

## 2019-03-29 RX ADMIN — PREDNISONE 60 MG: 20 TABLET ORAL at 19:17

## 2019-03-29 RX ADMIN — BUPIVACAINE HYDROCHLORIDE 5 MG: 5 INJECTION, SOLUTION EPIDURAL; INTRACAUDAL; PERINEURAL at 19:17

## 2019-03-29 RX ADMIN — ACETAMINOPHEN 650 MG: 325 TABLET ORAL at 19:17

## 2019-03-29 RX ADMIN — METHOCARBAMOL TABLETS 750 MG: 750 TABLET, COATED ORAL at 19:16

## 2019-03-29 NOTE — LETTER
[address] 
[facility] EMERGENCY DEPT 
[address] [address] 
[phone] Work/School Note Date: 3/29/2019 To Whom It May concern: 
 
Melquiades Ren was seen and treated today in the emergency room by the following provider(s): 
Attending Provider: Gypsy Youngblood MD 
Physician Assistant: RACHANA Gant. Melquiades Ren may return to work on 4/1/19 or sooner, if feeling better. Sincerely, RACHANA Patel

## 2019-03-29 NOTE — DISCHARGE INSTRUCTIONS
Patient Education        Cervical Disc Disease: Care Instructions  Your Care Instructions    Cervical disc disease results from damage, disease, or wear and tear to the discs between the bones (vertebra) in your neck. The discs act as shock absorbers for the spine and keep the spine flexible. When a disc is damaged, it can bulge out and press against the nerve roots or spinal cord. This is sometimes called a herniated or \"slipped disc. \" This pressure can cause pain and numbness or tingling in your arms and hands. It can also cause weakness in your legs. An accident can damage a disc and cause it to break open (rupture). Aging and hard physical work can also cause damage to cervical discs. The first treatments for cervical disc disease include physical therapy, special neck exercises, heat, and pain medicine. If these fail, your doctor may inject steroids and pain medicine into your neck. Surgery is usually done only if other treatments have not worked. Follow-up care is a key part of your treatment and safety. Be sure to make and go to all appointments, and call your doctor if you are having problems. It's also a good idea to know your test results and keep a list of the medicines you take. How can you care for yourself at home? · Take pain medicines exactly as directed. ? If the doctor gave you a prescription medicine for pain, take it as prescribed. ? If you are not taking a prescription pain medicine, ask your doctor if you can take an over-the-counter medicine. · Don't spend too long in one position. Take short breaks to move around and change positions. · Wear a seat belt and shoulder harness when you are in a car. · Sleep with a pillow under your head and neck that keeps your neck straight. · Follow your doctor's instructions for gentle neck-stretching exercises. · Do not smoke. Smoking can slow healing of your discs.  If you need help quitting, talk to your doctor about stop-smoking programs and medicines. These can increase your chances of quitting for good. · Avoid strenuous work or exercise until your doctor says it is okay. When should you call for help? Call 911 anytime you think you may need emergency care. For example, call if:    · You are unable to move an arm or a leg at all.   [facility] your doctor now or seek immediate medical care if:    · You have new or worse symptoms in your arms, legs, belly, or buttocks. Symptoms may include:  ? Numbness or tingling. ? Weakness. ? Pain.     · You lose bladder or bowel control.    Watch closely for changes in your health, and be sure to contact your doctor if:    · You do not get better as expected. Where can you learn more? Go to http://carmel-nicole.info/. Enter N118 in the search box to learn more about \"Cervical Disc Disease: Care Instructions. \"  Current as of: September 20, 2018  Content Version: 11.9  © 3647-3071 [employer]. Care instructions adapted under license by [employer] (which disclaims liability or warranty for this information). If you have questions about a medical condition or this instruction, always ask your healthcare professional. [place] [address] any warranty or liability for your use of this information. Patient Education        Musculoskeletal Chest Pain: Care Instructions  Your Care Instructions    Chest pain is not always a sign that something is wrong with your heart or that you have another serious problem. The doctor thinks your chest pain is caused by strained muscles or ligaments, inflamed chest cartilage, or another problem in your chest, rather than by your heart. You may need more tests to find the cause of your chest pain. Follow-up care is a key part of your treatment and safety. Be sure to make and go to all appointments, and call your doctor if you are having problems.  It's also a good idea to know your test results and keep a list of the medicines you take. How can you care for yourself at home? · Take pain medicines exactly as directed. ? If the doctor gave you a prescription medicine for pain, take it as prescribed. ? If you are not taking a prescription pain medicine, ask your doctor if you can take an over-the-counter medicine. · Rest and protect the sore area. · Stop, change, or take a break from any activity that may be causing your pain or soreness. · Put ice or a cold pack on the sore area for 10 to 20 minutes at a time. Try to do this every 1 to 2 hours for the next 3 days (when you are awake) or until the swelling goes down. Put a thin cloth between the ice and your skin. · After 2 or 3 days, apply a heating pad set on low or a warm cloth to the area that hurts. Some doctors suggest that you go back and forth between hot and cold. · Do not wrap or tape your ribs for support. This may cause you to take smaller breaths, which could increase your risk of lung problems. · Mentholated creams such as Bengay or Icy Hot may soothe sore muscles. Follow the instructions on the package. · Follow your doctor's instructions for exercising. · Gentle stretching and massage may help you get better faster. Stretch slowly to the point just before pain begins, and hold the stretch for at least 15 to 30 seconds. Do this 3 or 4 times a day. Stretch just after you have applied heat. · As your pain gets better, slowly return to your normal activities. Any increased pain may be a sign that you need to rest a while longer. When should you call for help? Call 911 anytime you think you may need emergency care. For example, call if:    · You have chest pain or pressure. This may occur with:  ? Sweating. ? Shortness of breath. ? Nausea or vomiting. ? Pain that spreads from the chest to the neck, jaw, or one or both shoulders or arms. ? Dizziness or lightheadedness. ? A fast or uneven pulse. After calling 911, chew 1 adult-strength aspirin.  Wait for an ambulance. Do not try to drive yourself.     · You have sudden chest pain and shortness of breath, or you cough up blood.    Call your doctor now or seek immediate medical care if:    · You have any trouble breathing.     · Your chest pain gets worse.     · Your chest pain occurs consistently with exercise and is relieved by rest.    Watch closely for changes in your health, and be sure to contact your doctor if:    · Your chest pain does not get better after 1 week. Where can you learn more? Go to http://carmel-nicole.info/. Enter V293 in the search box to learn more about \"Musculoskeletal Chest Pain: Care Instructions. \"  Current as of: September 23, 2018  Content Version: 11.9  © 0378-2097 [employer]. Care instructions adapted under license by [employer] (which disclaims liability or warranty for this information). If you have questions about a medical condition or this instruction, always ask your healthcare professional. [place] [address] any warranty or liability for your use of this information. Patient Education        Numbness and Tingling: Care Instructions  Your Care Instructions    Many things can cause numbness or tingling. Swelling may put pressure on a nerve. This could cause you to lose feeling or have a pins-and-needles sensation on part of your body. Nerves may be damaged from trauma, toxins, or diseases, such as diabetes or multiple sclerosis (MS). Sometimes, though, the cause is not clear. If there is no clear reason for your symptoms, and you are not having any other symptoms, your doctor may suggest watching and waiting for a while to see if the numbness or tingling goes away on its own. Your doctor may want you to have blood or nerve tests to find the cause of your symptoms. Follow-up care is a key part of your treatment and safety.  Be sure to make and go to all appointments, and call your doctor if you are having problems. It's also a good idea to know your test results and keep a list of the medicines you take. How can you care for yourself at home? · If your doctor prescribes medicine, take it exactly as directed. Call your doctor if you think you are having a problem with your medicine. · If you have any swelling, put ice or a cold pack on the area for 10 to 20 minutes at a time. Put a thin cloth between the ice and your skin. When should you call for help? Call 911 anytime you think you may need emergency care. For example, call if:    · You have weakness, numbness, or tingling in both legs.     · You lose bowel or bladder control.     · You have symptoms of a stroke. These may include:  ? Sudden numbness, tingling, weakness, or loss of movement in your face, arm, or leg, especially on only one side of your body. ? Sudden vision changes. ? Sudden trouble speaking. ? Sudden confusion or trouble understanding simple statements. ? Sudden problems with walking or balance. ? A sudden, severe headache that is different from past headaches.    Watch closely for changes in your health, and be sure to contact your doctor if you have any problems, or if:    · You do not get better as expected. Where can you learn more? Go to http://carmel-nicole.info/. Enter Y822 in the search box to learn more about \"Numbness and Tingling: Care Instructions. \"  Current as of: Elena 3, 2018  Content Version: 11.9  © 0226-7919 [employer]. Care instructions adapted under license by [employer] (which disclaims liability or warranty for this information). If you have questions about a medical condition or this instruction, always ask your healthcare professional. [place] [address] any warranty or liability for your use of this information.

## 2019-03-29 NOTE — ED NOTES
Left sided chest pain that started last night without SOB. Patient states the pain radiates to his left arm. Patient denies cardiac history. He states when the pain started he was laying down, with nothing making the pain better or worse. Patient denies SOB at this time, and states the chest pain has decreased in intensity, but the tingling in his left arm has continued. Patient states before calling EMS, he began having a headache with nausea, which has also decreased in intensity.

## 2019-03-29 NOTE — ED NOTES
All discharge paperwork reviewed with patient and PA and he denies any need for further explanation regarding these instructions.   Denies need for wheelchair and ambulates out of ED

## 2019-03-29 NOTE — ED PROVIDER NOTES
EMERGENCY DEPARTMENT HISTORY AND PHYSICAL EXAM 
     
 
Date: 3/29/2019 Patient Name: Helena Guerrero History of Presenting Illness Chief Complaint Patient presents with  Chest Pain  
  patient from EMS patient is having chest pain that started about 1 hour pta of ems patient was given aspirin with ems and the pain has gotten better History Provided By: Patient HPI: Helena Guerrero is a 53 y.o. male, pmhx of autoimmune disease, who presents ambulatory via EMS to the ED c/o numbness and tingling in his left arm since yesterday that radiated into his upper left chest 1 hour prior to arrival.  Patient does not have a cardiac history. Patient has received injection in his neck from having a bad neck. Pain is no longer in his chest.  He does have pain and spasms of the muscles of his left superior and posterior shoulder. He denies any injury. He received aspirin in route. He specifically denies any recent fevers, chills, nausea, vomiting, diarrhea, abd pain, , urinary sxs, changes in BM, or headache. PCP: Marissa Bucio MD 
 
There are no other complaints, changes, or physical findings at this time. Current Facility-Administered Medications Medication Dose Route Frequency Provider Last Rate Last Dose  sodium chloride (NS) flush 5-40 mL  5-40 mL IntraVENous Q8H Hussein Casper MD      
 sodium chloride (NS) flush 5-40 mL  5-40 mL IntraVENous PRN Hussein Casper MD      
 predniSONE (DELTASONE) tablet 60 mg  60 mg Oral NOW RACHANA Kong      
 methocarbamol (ROBAXIN) tablet 750 mg  750 mg Oral NOW RACHANA Kong      
 acetaminophen (TYLENOL) tablet 650 mg  650 mg Oral NOW RACHANA Kong      
 bupivacaine (PF) (MARCAINE) 0.5 % (5 mg/mL) injection 5 mg  1 mL Peripheral Nerve Block NOW RACHANA Kong Current Outpatient Medications Medication Sig Dispense Refill  dolutegravir sodium (TIVICAY PO) Take  by mouth.  gabapentin (NEURONTIN) 300 mg capsule Take 300 mg by mouth three (3) times daily.  traZODone (DESYREL) 50 mg tablet Take 50 mg by mouth nightly.  sertraline (ZOLOFT) 100 mg tablet TAKE ONE TABLET BY MOUTH ONCE DAILY 30 Tab 5  
 ODEFSEY 200-25-25 mg tab Past History Past Medical History: 
Past Medical History:  
Diagnosis Date  Autoimmune disease ([facility] 2001  Liver disease Hep C treated 7/2016  Psychotic disorder ([facility] 2014 Depression Past Surgical History: 
Past Surgical History:  
Procedure Laterality Date  HX LYMPH NODE DISSECTION Right 2001 [place] [address] EXTRACTION Bilateral 1995 Family History: 
Family History Problem Relation Age of Onset  No Known Problems Mother  No Known Problems Father  No Known Problems Sister Social History: 
Social History Tobacco Use  Smoking status: Current Every Day Smoker Packs/day: 1.00 Years: 30.00 Pack years: 30.00 Types: Cigarettes  Smokeless tobacco: Never Used Substance Use Topics  Alcohol use: Yes Comment: rarely  Drug use: No  
 
 
Allergies: Allergies Allergen Reactions  Abacavir Other (comments) Interaction with other medication Review of Systems Review of Systems Constitutional: Negative for activity change, appetite change, chills, fatigue and fever. HENT: Negative for congestion, dental problem, rhinorrhea and sore throat. Eyes: Negative for photophobia, pain, discharge, redness, itching and visual disturbance. Respiratory: Negative for cough, chest tightness, shortness of breath and wheezing. Cardiovascular: Positive for chest pain. Negative for leg swelling. Gastrointestinal: Negative for abdominal distention, abdominal pain, blood in stool, constipation, diarrhea, nausea and vomiting. Genitourinary: Negative for discharge, dysuria, flank pain, hematuria and penile pain. Musculoskeletal: Positive for myalgias. Negative for arthralgias, back pain, gait problem and joint swelling. Skin: Negative for color change and rash. Allergic/Immunologic: Positive for immunocompromised state. Neurological: Positive for numbness. Negative for dizziness, syncope, weakness and headaches. Psychiatric/Behavioral: Negative for confusion and decreased concentration. The patient is not nervous/anxious. Physical Exam  
Physical Exam  
Constitutional: He is oriented to person, place, and time. Vital signs are normal. He appears well-developed and well-nourished. He does not appear ill. No distress. Ambulatory, in no apparent distress HENT:  
Head: Normocephalic and atraumatic. Right Ear: External ear normal.  
Left Ear: External ear normal.  
Nose: Nose normal.  
Mouth/Throat: Oropharynx is clear and moist. No oropharyngeal exudate. Eyes: Pupils are equal, round, and reactive to light. Conjunctivae and EOM are normal. Right eye exhibits no discharge. Left eye exhibits no discharge. No scleral icterus. Neck: Normal range of motion. Neck supple. No tracheal deviation present. Cardiovascular: Normal rate, regular rhythm, normal heart sounds and intact distal pulses. Exam reveals no gallop and no friction rub. No murmur heard. Pulmonary/Chest: Effort normal and breath sounds normal. No respiratory distress. He has no wheezes. He has no rales. He exhibits no tenderness. No tenderness to palpation of his left anterior chest.  
Musculoskeletal: He exhibits no edema. Left shoulder: He exhibits tenderness, pain and spasm. Left elbow: Normal.  
     Left wrist: Normal.  
     Cervical back: He exhibits tenderness, pain and spasm. He exhibits no bony tenderness. Left hand: Normal.  
Spasm and tenderness to palpation in the left trapezius. AIN/PIN/ulnar/radial motor nerves intact distal left upper extremity. Lymphadenopathy:  
  He has no cervical adenopathy. Neurological: He is alert and oriented to person, place, and time. No cranial nerve deficit. Coordination normal.  
Skin: Skin is warm and dry. No rash noted. No erythema. Psychiatric: He has a normal mood and affect. His behavior is normal. Judgment and thought content normal.  
Nursing note and vitals reviewed. Diagnostic Study Results Labs - Recent Results (from the past 12 hour(s)) EKG, 12 LEAD, INITIAL Collection Time: 03/29/19  5:48 PM  
Result Value Ref Range Ventricular Rate 70 BPM  
 Atrial Rate 70 BPM  
 P-R Interval 128 ms QRS Duration 86 ms  
 Q-T Interval 382 ms QTC Calculation (Bezet) 412 ms Calculated P Axis 66 degrees Calculated R Axis 45 degrees Calculated T Axis 63 degrees Diagnosis Normal sinus rhythm Normal ECG No previous ECGs available METABOLIC PANEL, COMPREHENSIVE Collection Time: 03/29/19  6:42 PM  
Result Value Ref Range Sodium 142 136 - 145 mmol/L Potassium 3.6 3.5 - 5.1 mmol/L Chloride 110 (H) 97 - 108 mmol/L  
 CO2 26 21 - 32 mmol/L Anion gap 6 5 - 15 mmol/L Glucose 86 65 - 100 mg/dL BUN 10 6 - 20 MG/DL Creatinine 1.12 0.70 - 1.30 MG/DL  
 BUN/Creatinine ratio 9 (L) 12 - 20 GFR est AA >60 >60 ml/min/1.73m2 GFR est non-AA >60 >60 ml/min/1.73m2 Calcium 8.4 (L) 8.5 - 10.1 MG/DL Bilirubin, total 0.4 0.2 - 1.0 MG/DL  
 ALT (SGPT) 18 12 - 78 U/L  
 AST (SGOT) 15 15 - 37 U/L Alk. phosphatase 77 45 - 117 U/L Protein, total 7.2 6.4 - 8.2 g/dL Albumin 3.7 3.5 - 5.0 g/dL Globulin 3.5 2.0 - 4.0 g/dL A-G Ratio 1.1 1.1 - 2.2    
CBC WITH AUTOMATED DIFF Collection Time: 03/29/19  6:42 PM  
Result Value Ref Range WBC 6.5 4.1 - 11.1 K/uL  
 RBC 4.95 4.10 - 5.70 M/uL  
 HGB 16.2 12.1 - 17.0 g/dL HCT 46.0 36.6 - 50.3 % MCV 92.9 80.0 - 99.0 FL  
 MCH 32.7 26.0 - 34.0 PG  
 MCHC 35.2 30.0 - 36.5 g/dL  
 RDW 13.8 11.5 - 14.5 % PLATELET 448 588 - 968 K/uL  MPV 9.4 8.9 - 12.9 FL  
 NRBC 0.0 0  WBC ABSOLUTE NRBC 0.00 0.00 - 0.01 K/uL NEUTROPHILS 52 32 - 75 % LYMPHOCYTES 39 12 - 49 % MONOCYTES 8 5 - 13 % EOSINOPHILS 1 0 - 7 % BASOPHILS 0 0 - 1 % IMMATURE GRANULOCYTES 0 0.0 - 0.5 % ABS. NEUTROPHILS 3.3 1.8 - 8.0 K/UL  
 ABS. LYMPHOCYTES 2.5 0.8 - 3.5 K/UL  
 ABS. MONOCYTES 0.5 0.0 - 1.0 K/UL  
 ABS. EOSINOPHILS 0.1 0.0 - 0.4 K/UL  
 ABS. BASOPHILS 0.0 0.0 - 0.1 K/UL  
 ABS. IMM. GRANS. 0.0 0.00 - 0.04 K/UL  
 DF AUTOMATED    
TROPONIN I Collection Time: 03/29/19  6:42 PM  
Result Value Ref Range Troponin-I, Qt. <0.05 <0.05 ng/mL Radiologic Studies -  
XR CHEST PA LAT Final Result  
 impression: Elevated left hemidiaphragm. CT Results  (Last 48 hours) None CXR Results  (Last 48 hours) 03/29/19 1757  XR CHEST PA LAT Final result Impression:   impression: Elevated left hemidiaphragm. Narrative:  Clinical indication: Chest pain. Frontal and lateral views of the chest obtained no prior. There is elevation of  
the left hemidiaphragm with blunting of the angle. The heart size is normal. No  
acute infiltrate. Medical Decision Making I am the first provider for this patient. I reviewed the vital signs, available nursing notes, past medical history, past surgical history, family history and social history. Vital Signs-Reviewed the patient's vital signs. Patient Vitals for the past 12 hrs: 
 Temp Pulse Resp BP SpO2  
03/29/19 1743 98.4 °F (36.9 °C) 76 18 131/90 100 % Records Reviewed: Nursing Notes, Old Medical Records and Ambulance Run Sheet Provider Notes (Medical Decision Making): DDx: Degenerative disc disease, radiculopathy, paresthesia, torticollis, cardiac event ED Course:  
Initial assessment performed.  The patients presenting problems have been discussed, and they are in agreement with the care plan formulated and outlined with them. I have encouraged them to ask questions as they arise throughout their visit. 7:10PM 
Discussed the case with Dr. Solange Wu. She agrees to the care plan. PROGRESS NOTE: 
  
Procedure Note - Trigger Point Injection:   
7:20 PM 
Performed by Oracio Borrego PA-C A trigger point injection was performed on the left posterior shoulder. Area was prepped with a alcohol. 1 mL of 0.5% bupivicaine without epinephrine was injected. Total time at bedside, performing this procedure was 3 minutes. The procedure was tolerated well without any complications. 7:43 PM 
Pt noted to be feeling better, ready for discharge. Updated pt and/or family on available findings. Will follow up as instructed. All questions have been answered, pt voiced understanding and agreement with plan. Bobby Hannon Specific return precautions provided as well as instructions to return to the ED should sx worsen at any time. Vital signs stable for discharge. YASMIN Hsu Disposition: 
 
DISCHARGE NOTE: 
7:43PM 
The patient's results have been reviewed with pt. They verbally convey their understanding and agreement of the patient's signs, symptoms, diagnosis, treatment, and prognosis. They additionally agree to follow up as recommended in the discharge instructions or to return to the Emergency Room should the patient's condition change prior to their follow-up appointment. The patient verbally agrees with the care-plan and all of their questions have been answered. The discharge instructions have also been provided to the them along with educational information regarding the patient's diagnosis and a list of reasons why the patient would want to return to the ER prior to their follow-up appointment should their condition change. YASMIN Hsu PLAN: 
1. Discharge Medication List as of 3/29/2019  7:35 PM  
  
START taking these medications Details predniSONE (DELTASONE) 20 mg tablet Take 60 mg by mouth daily for 5 days. , Normal, Disp-15 Tab, R-0  
  
methocarbamol (ROBAXIN-750) 750 mg tablet Take 1 Tab by mouth three (3) times daily. , Normal, Disp-20 Tab, R-0  
  
acetaminophen (TYLENOL) 325 mg tablet Take 2 Tabs by mouth every six (6) hours as needed for Pain., Normal, Disp-20 Tab, R-0  
  
  
CONTINUE these medications which have NOT CHANGED Details  
dolutegravir sodium (TIVICAY PO) Take  by mouth., Historical Med  
  
gabapentin (NEURONTIN) 300 mg capsule Take 300 mg by mouth three (3) times daily. , Historical Med  
  
traZODone (DESYREL) 50 mg tablet Take 50 mg by mouth nightly., Historical Med  
  
sertraline (ZOLOFT) 100 mg tablet TAKE ONE TABLET BY MOUTH ONCE DAILY, Normal, Disp-30 Tab, R-5  
  
ODEFSEY 200-25-25 mg tab Historical Med, ZOLTAN  
  
  
 
2. Follow-up Information Follow up With Specialties Details Why Contact Info Loulou Bragg MD [facility]   [address] [place] [id number] [phone] [facility] EMERGENCY DEPT Emergency Medicine  If symptoms worsen [address] [place] [address] [address] [facility] 
[phone] Yola Green MD Neurosurgery Schedule an appointment as soon as possible for a visit neck specialist [address] [place] [address] [address] [place] [address] 
[phone] Return to ED if worse Diagnosis Clinical Impression: 1. Degenerative disc disease, cervical   
2. Paresthesia and pain of left extremity 3. Musculoskeletal chest pain This note will not be viewable in [address]

## 2019-03-30 LAB
ATRIAL RATE: 70 BPM
CALCULATED P AXIS, ECG09: 66 DEGREES
CALCULATED R AXIS, ECG10: 45 DEGREES
CALCULATED T AXIS, ECG11: 63 DEGREES
DIAGNOSIS, 93000: NORMAL
P-R INTERVAL, ECG05: 128 MS
Q-T INTERVAL, ECG07: 382 MS
QRS DURATION, ECG06: 86 MS
QTC CALCULATION (BEZET), ECG08: 412 MS
VENTRICULAR RATE, ECG03: 70 BPM

## 2019-04-04 ENCOUNTER — OFFICE VISIT (OUTPATIENT)
Dept: INTERNAL MEDICINE CLINIC | Age: 49
End: 2019-04-04

## 2019-04-04 VITALS
SYSTOLIC BLOOD PRESSURE: 106 MMHG | HEART RATE: 62 BPM | BODY MASS INDEX: 21.83 KG/M2 | OXYGEN SATURATION: 99 % | DIASTOLIC BLOOD PRESSURE: 65 MMHG | RESPIRATION RATE: 16 BRPM | HEIGHT: 67 IN | TEMPERATURE: 97.3 F | WEIGHT: 139.1 LBS

## 2019-04-04 DIAGNOSIS — Z13.220 SCREENING FOR CHOLESTEROL LEVEL: ICD-10-CM

## 2019-04-04 DIAGNOSIS — G47.00 INSOMNIA, UNSPECIFIED TYPE: ICD-10-CM

## 2019-04-04 DIAGNOSIS — Z13.1 SCREENING FOR DIABETES MELLITUS: ICD-10-CM

## 2019-04-04 DIAGNOSIS — F33.0 MILD EPISODE OF RECURRENT MAJOR DEPRESSIVE DISORDER (HCC): ICD-10-CM

## 2019-04-04 DIAGNOSIS — D89.89 AUTOIMMUNE DISORDER (HCC): ICD-10-CM

## 2019-04-04 DIAGNOSIS — Z00.00 ENCOUNTER FOR MEDICAL EXAMINATION TO ESTABLISH CARE: Primary | ICD-10-CM

## 2019-04-04 DIAGNOSIS — G62.9 NEUROPATHY: ICD-10-CM

## 2019-04-04 DIAGNOSIS — Z12.5 SCREENING FOR PROSTATE CANCER: ICD-10-CM

## 2019-04-04 DIAGNOSIS — G89.29 CHRONIC BILATERAL LOW BACK PAIN WITH RIGHT-SIDED SCIATICA: ICD-10-CM

## 2019-04-04 DIAGNOSIS — Z71.6 ENCOUNTER FOR SMOKING CESSATION COUNSELING: ICD-10-CM

## 2019-04-04 DIAGNOSIS — M54.41 CHRONIC BILATERAL LOW BACK PAIN WITH RIGHT-SIDED SCIATICA: ICD-10-CM

## 2019-04-04 LAB
BILIRUB UR QL STRIP: NEGATIVE
CHOLEST SERPL-MCNC: 165 MG/DL
GLUCOSE UR-MCNC: NEGATIVE MG/DL
HBA1C MFR BLD HPLC: 5.2 %
HDLC SERPL-MCNC: 48 MG/DL
KETONES P FAST UR STRIP-MCNC: NEGATIVE MG/DL
LDL CHOLESTEROL POC: 91 MG/DL
PH UR STRIP: 6.5 [PH] (ref 4.6–8)
PROT UR QL STRIP: NEGATIVE
SP GR UR STRIP: 1.01 (ref 1–1.03)
TCHOL/HDL RATIO (POC): 3.5
TRIGL SERPL-MCNC: 128 MG/DL
UA UROBILINOGEN AMB POC: NORMAL (ref 0.2–1)
URINALYSIS CLARITY POC: CLEAR
URINALYSIS COLOR POC: YELLOW
URINE BLOOD POC: NEGATIVE
URINE LEUKOCYTES POC: NEGATIVE
URINE NITRITES POC: NEGATIVE
VLDLC SERPL CALC-MCNC: 117 MG/DL

## 2019-04-04 RX ORDER — GABAPENTIN 300 MG/1
300 CAPSULE ORAL 3 TIMES DAILY
Qty: 90 CAP | Refills: 3 | Status: SHIPPED | OUTPATIENT
Start: 2019-04-04 | End: 2020-08-20 | Stop reason: ALTCHOICE

## 2019-04-04 RX ORDER — SERTRALINE HYDROCHLORIDE 100 MG/1
100 TABLET, FILM COATED ORAL DAILY
Qty: 30 TAB | Refills: 5 | Status: SHIPPED | OUTPATIENT
Start: 2019-04-04 | End: 2020-08-20 | Stop reason: SINTOL

## 2019-04-04 RX ORDER — VARENICLINE TARTRATE 25 MG
KIT ORAL
Qty: 1 DOSE PACK | Refills: 3 | Status: SHIPPED | OUTPATIENT
Start: 2019-04-04 | End: 2019-05-09 | Stop reason: DRUGHIGH

## 2019-04-04 RX ORDER — TRAZODONE HYDROCHLORIDE 50 MG/1
50 TABLET ORAL
Qty: 30 TAB | Refills: 5 | Status: SHIPPED | OUTPATIENT
Start: 2019-04-04 | End: 2019-05-09

## 2019-04-04 NOTE — PATIENT INSTRUCTIONS
Back Pain: Care Instructions  Your Care Instructions    Back pain has many possible causes. It is often related to problems with muscles and ligaments of the back. It may also be related to problems with the nerves, discs, or bones of the back. Moving, lifting, standing, sitting, or sleeping in an awkward way can strain the back. Sometimes you don't notice the injury until later. Arthritis is another common cause of back pain. Although it may hurt a lot, back pain usually improves on its own within several weeks. Most people recover in 12 weeks or less. Using good home treatment and being careful not to stress your back can help you feel better sooner. Follow-up care is a key part of your treatment and safety. Be sure to make and go to all appointments, and call your doctor if you are having problems. It's also a good idea to know your test results and keep a list of the medicines you take. How can you care for yourself at home? · Sit or lie in positions that are most comfortable and reduce your pain. Try one of these positions when you lie down:  ? Lie on your back with your knees bent and supported by large pillows. ? Lie on the floor with your legs on the seat of a sofa or chair. ? Lie on your side with your knees and hips bent and a pillow between your legs. ? Lie on your stomach if it does not make pain worse. · Do not sit up in bed, and avoid soft couches and twisted positions. Bed rest can help relieve pain at first, but it delays healing. Avoid bed rest after the first day of back pain. · Change positions every 30 minutes. If you must sit for long periods of time, take breaks from sitting. Get up and walk around, or lie in a comfortable position. · Try using a heating pad on a low or medium setting for 15 to 20 minutes every 2 or 3 hours. Try a warm shower in place of one session with the heating pad. · You can also try an ice pack for 10 to 15 minutes every 2 to 3 hours.  Put a thin cloth between the ice pack and your skin. · Take pain medicines exactly as directed. ? If the doctor gave you a prescription medicine for pain, take it as prescribed. ? If you are not taking a prescription pain medicine, ask your doctor if you can take an over-the-counter medicine. · Take short walks several times a day. You can start with 5 to 10 minutes, 3 or 4 times a day, and work up to longer walks. Walk on level surfaces and avoid hills and stairs until your back is better. · Return to work and other activities as soon as you can. Continued rest without activity is usually not good for your back. · To prevent future back pain, do exercises to stretch and strengthen your back and stomach. Learn how to use good posture, safe lifting techniques, and proper body mechanics. When should you call for help? Call your doctor now or seek immediate medical care if:    · You have new or worsening numbness in your legs.     · You have new or worsening weakness in your legs. (This could make it hard to stand up.)     · You lose control of your bladder or bowels.    Watch closely for changes in your health, and be sure to contact your doctor if:    · You have a fever, lose weight, or don't feel well.     · You do not get better as expected. Where can you learn more? Go to http://carmel-nicole.info/. Enter M784 in the search box to learn more about \"Back Pain: Care Instructions. \"  Current as of: September 20, 2018  Content Version: 11.9  © 5188-3049 [employer]. Care instructions adapted under license by [employer] (which disclaims liability or warranty for this information). If you have questions about a medical condition or this instruction, always ask your healthcare professional. [place] [address] any warranty or liability for your use of this information.          Recovering From Depression: Care Instructions  Your Care Instructions    Taking good care of yourself is important as you recover from depression. In time, your symptoms will fade as your treatment takes hold. Do not give up. Instead, focus your energy on getting better. Your mood will improve. It just takes some time. Focus on things that can help you feel better, such as being with friends and family, eating well, and getting enough rest. But take things slowly. Do not do too much too soon. You will begin to feel better gradually. Follow-up care is a key part of your treatment and safety. Be sure to make and go to all appointments, and call your doctor if you are having problems. It's also a good idea to know your test results and keep a list of the medicines you take. How can you care for yourself at home? Be realistic  · If you have a large task to do, break it up into smaller steps you can handle, and just do what you can. · You may want to put off important decisions until your depression has lifted. If you have plans that will have a major impact on your life, such as marriage, divorce, or a job change, try to wait a bit. Talk it over with friends and loved ones who can help you look at the overall picture first.  · Reaching out to people for help is important. Do not isolate yourself. Let your family and friends help you. Find someone you can trust and confide in, and talk to that person. · Be patient, and be kind to yourself. Remember that depression is not your fault and is not something you can overcome with willpower alone. Treatment is necessary for depression, just like for any other illness. Feeling better takes time, and your mood will improve little by little. Stay active  · Stay busy and get outside. Take a walk, or try some other light exercise. · Talk with your doctor about an exercise program. Exercise can help with mild depression. · Go to a movie or concert. Take part in a Yazdanism activity or other social gathering. Go to a ball game.   · Ask a friend to have dinner with you.  Take care of yourself  · Eat a balanced diet with plenty of fresh fruits and vegetables, whole grains, and lean protein. If you have lost your appetite, eat small snacks rather than large meals. · Avoid drinking alcohol or using illegal drugs. Do not take medicines that have not been prescribed for you. They may interfere with medicines you may be taking for depression, or they may make your depression worse. · Take your medicines exactly as they are prescribed. You may start to feel better within 1 to 3 weeks of taking antidepressant medicine. But it can take as many as 6 to 8 weeks to see more improvement. If you have questions or concerns about your medicines, or if you do not notice any improvement by 3 weeks, talk to your doctor. · If you have any side effects from your medicine, tell your doctor. Antidepressants can make you feel tired, dizzy, or nervous. Some people have dry mouth, constipation, headaches, sexual problems, or diarrhea. Many of these side effects are mild and will go away on their own after you have been taking the medicine for a few weeks. Some may last longer. Talk to your doctor if side effects are bothering you too much. You might be able to try a different medicine. · Get enough sleep. If you have problems sleeping:  ? Go to bed at the same time every night, and get up at the same time every morning. ? Keep your bedroom dark and quiet. ? Do not exercise after 5:00 p.m.  ? Avoid drinks with caffeine after 5:00 p.m. · Avoid sleeping pills unless they are prescribed by the doctor treating your depression. Sleeping pills may make you groggy during the day, and they may interact with other medicine you are taking. · If you have any other illnesses, such as diabetes, heart disease, or high blood pressure, make sure to continue with your treatment. Tell your doctor about all of the medicines you take, including those with or without a prescription.   · Keep the numbers for these national suicide hotlines: 9-953-810-TALK (5-788.338.7589) and 5-520-UHPVSSC (3-561.879.7625). If you or someone you know talks about suicide or feeling hopeless, get help right away. When should you call for help? Call 911 anytime you think you may need emergency care. For example, call if:    · You feel like hurting yourself or someone else.     · Someone you know has depression and is about to attempt or is attempting suicide.   [facility] your doctor now or seek immediate medical care if:    · You hear voices.     · Someone you know has depression and:  ? Starts to give away his or her possessions. ? Uses illegal drugs or drinks alcohol heavily. ? Talks or writes about death, including writing suicide notes or talking about guns, knives, or pills. ? Starts to spend a lot of time alone. ? Acts very aggressively or suddenly appears calm.    Watch closely for changes in your health, and be sure to contact your doctor if:    · You do not get better as expected. Where can you learn more? Go to http://carmelTicketBoxnicole.info/. Enter T634 in the search box to learn more about \"Recovering From Depression: Care Instructions. \"  Current as of: September 11, 2018  Content Version: 11.9  © 0732-9732 [employer]. Care instructions adapted under license by [email or web address] (which disclaims liability or warranty for this information). If you have questions about a medical condition or this instruction, always ask your healthcare professional. [place] [address] any warranty or liability for your use of this information. Insomnia: Care Instructions  Your Care Instructions    Insomnia is the inability to sleep well. It is a common problem for most people at some time. Insomnia may make it hard for you to get to sleep, stay asleep, or sleep as long as you need to. This can make you tired and grouchy during the day.  It can also make you forgetful, less effective at work, and unhappy. Insomnia can be caused by conditions such as depression or anxiety. Pain can also affect your ability to sleep. When these problems are solved, the insomnia usually clears up. But sometimes bad sleep habits can cause insomnia. If insomnia is affecting your work or your enjoyment of life, you can take steps to improve your sleep. Follow-up care is a key part of your treatment and safety. Be sure to make and go to all appointments, and call your doctor if you are having problems. It's also a good idea to know your test results and keep a list of the medicines you take. How can you care for yourself at home? What to avoid  · Do not have drinks with caffeine, such as coffee or black tea, for 8 hours before bed. · Do not smoke or use other types of tobacco near bedtime. Nicotine is a stimulant and can keep you awake. · Avoid drinking alcohol late in the evening, because it can cause you to wake in the middle of the night. · Do not eat a big meal close to bedtime. If you are hungry, eat a light snack. · Do not drink a lot of water close to bedtime, because the need to urinate may wake you up during the night. · Do not read or watch TV in bed. Use the bed only for sleeping and sexual activity. What to try  · Go to bed at the same time every night, and wake up at the same time every morning. Do not take naps during the day. · Keep your bedroom quiet, dark, and cool. · Sleep on a comfortable pillow and mattress. · If watching the clock makes you anxious, turn it facing away from you so you cannot see the time. · If you worry when you lie down, start a worry book. Well before bedtime, write down your worries, and then set the book and your concerns aside. · Try meditation or other relaxation techniques before you go to bed. · If you cannot fall asleep, get up and go to another room until you feel sleepy. Do something relaxing. Repeat your bedtime routine before you go to bed again.   · Make your house quiet and calm about an hour before bedtime. Turn down the lights, turn off the TV, log off the computer, and turn down the volume on music. This can help you relax after a busy day. When should you call for help? Watch closely for changes in your health, and be sure to contact your doctor if:    · Your efforts to improve your sleep do not work.     · Your insomnia gets worse.     · You have been feeling down, depressed, or hopeless or have lost interest in things that you usually enjoy. Where can you learn more? Go to http://carmel-nicole.info/. Enter P513 in the search box to learn more about \"Insomnia: Care Instructions. \"  Current as of: June 28, 2018  Content Version: 11.9  © 5329-8654 [employer]. Care instructions adapted under license by [employer] (which disclaims liability or warranty for this information). If you have questions about a medical condition or this instruction, always ask your healthcare professional. [place] [address] any warranty or liability for your use of this information. Neuropathic Pain: Care Instructions  Your Care Instructions    Neuropathic pain is caused by pressure on or damage to your nerves. It's often simply called nerve pain. Some people feel this type of pain all the time. For others, it comes and goes. Diabetes, shingles, or an injury can cause nerve pain. Many people say the pain feels sharp, burning, or stabbing. But some people feel it as a dull ache. In some cases, it makes your skin very sensitive. So touch, pressure, and other sensations that did not hurt before may now cause pain. It's important to know that this kind of pain is real and can affect your quality of life. It's also important to know that treatment can help. Treatment includes pain medicines, exercise, and physical therapy. Medicines can help reduce the number of pain signals that travel over the nerves.  This can make the painful areas less sensitive. It can also help you sleep better and improve your mood. But medicines are only one part of successful treatment. Most people do best with more than one kind of treatment. Your doctor may recommend that you try cognitive-behavioral therapy and stress management. Or, if needed, you may decide to try to quit smoking, lower your blood pressure, or better control blood sugar. These kinds of healthy changes can also make a difference. If you feel that your treatment is not working, talk to your doctor. And be sure to tell your doctor if you think you might be depressed or anxious. These are common problems that can also be treated. Follow-up care is a key part of your treatment and safety. Be sure to make and go to all appointments, and call your doctor if you are having problems. It's also a good idea to know your test results and keep a list of the medicines you take. How can you care for yourself at home? · Be safe with medicines. Read and follow all instructions on the label. ? If the doctor gave you a prescription medicine for pain, take it as prescribed. ? If you are not taking a prescription pain medicine, ask your doctor if you can take an over-the-counter medicine. · Save hard tasks for days when you have less pain. Follow a hard task with an easy task. And remember to take breaks. · Relax, and reduce stress. You may want to try deep breathing or meditation. These can help. · Keep moving. Gentle, daily exercise can help reduce pain. Your doctor or physical therapist can tell you what type of exercise is best for you. This may include walking, swimming, and stationary biking. It may also include stretches and range-of-motion exercises. · Try heat, cold packs, and massage. · Get enough sleep. Constant pain can make you more tired. If the pain makes it hard to sleep, talk with your doctor. · Think positively. Your thoughts can affect your pain.  Do fun things to distract yourself from the pain. See a movie, read a book, listen to music, or spend time with a friend. · Keep a pain diary. Try to write down how strong your pain is and what it feels like. Also try to notice and write down how your moods, thoughts, sleep, activities, and medicine affect your pain. These notes can help you and your doctor find the best ways to treat your pain. Reducing constipation caused by pain medicine  Pain medicines often cause constipation. To reduce constipation:  · Include fruits, vegetables, beans, and whole grains in your diet each day. These foods are high in fiber. · Drink plenty of fluids, enough so that your urine is light yellow or clear like water. If you have kidney, heart, or liver disease and have to limit fluids, talk with your doctor before you increase the amount of fluids you drink. · Get some exercise every day. Build up slowly to 30 to 60 minutes a day on 5 or more days of the week. · Take a fiber supplement, such as Citrucel or Metamucil, every day if needed. Read and follow all instructions on the label. · Schedule time each day for a bowel movement. Having a daily routine may help. Take your time and do not strain when having a bowel movement. · Ask your doctor about a laxative. The goal is to have one easy bowel movement every 1 to 2 days. Do not let constipation go untreated for more than 3 days. When should you call for help? Call your doctor now or seek immediate medical care if:    · You feel sad, anxious, or hopeless for more than a few days. This could mean you are depressed. Depression is common in people who have a lot of pain. But it can be treated.     · You have trouble with bowel movements, such as:  ? No bowel movement in 3 days. ? Blood in the anal area, in your stool, or on the toilet paper.   ? Diarrhea for more than 24 hours.    Watch closely for changes in your health, and be sure to contact your doctor if:    · Your pain is getting worse.     · You can't sleep because of pain.     · You are very worried or anxious about your pain.     · You have trouble taking your pain medicine.     · You have any concerns about your pain medicine or its side effects.     · You have vomiting or cramps for more than 2 hours. Where can you learn more? Go to http://carmel-nicole.info/. Enter B428 in the search box to learn more about \"Neuropathic Pain: Care Instructions. \"  Current as of: Elena 3, 2018  Content Version: 11.9  © 3347-0140 [employer]. Care instructions adapted under license by [employer] (which disclaims liability or warranty for this information). If you have questions about a medical condition or this instruction, always ask your healthcare professional. [address] any warranty or liability for your use of this information. Learning About Benefits From Quitting Smoking  How does quitting smoking make you healthier? If you're thinking about quitting smoking, you may have a few reasons to be smoke-free. Your health may be one of them. · When you quit smoking, you lower your risks for cancer, lung disease, heart attack, stroke, blood vessel disease, and blindness from macular degeneration. · When you're smoke-free, you get sick less often, and you heal faster. You are less likely to get colds, flu, bronchitis, and pneumonia. · As a nonsmoker, you may find that your mood is better and you are less stressed. When and how will you feel healthier? Quitting has real health benefits that start from day 1 of being smoke-free. And the longer you stay smoke-free, the healthier you get and the better you feel. The first hours  · After just 20 minutes, your blood pressure and heart rate go down. That means there's less stress on your heart and blood vessels. · Within 12 hours, the level of carbon monoxide in your blood drops back to normal. That makes room for more oxygen.  With more oxygen in your body, you may notice that you have more energy than when you smoked. After 2 weeks  · Your lungs start to work better. · Your risk of heart attack starts to drop. After 1 month  · When your lungs are clear, you cough less and breathe deeper, so it's easier to be active. · Your sense of taste and smell return. That means you can enjoy food more than you have since you started smoking. Over the years  · After 1 year, your risk of heart disease is half what it would be if you kept smoking. · After 5 years, your risk of stroke starts to shrink. Within a few years after that, it's about the same as if you'd never smoked. · After 10 years, your risk of dying from lung cancer is cut by about half. And your risk for many other types of cancer is lower too. How would quitting help others in your life? When you quit smoking, you improve the health of everyone who now breathes in your smoke. · Their heart, lung, and cancer risks drop, much like yours. · They are sick less. For babies and small children, living smoke-free means they're less likely to have ear infections, pneumonia, and bronchitis. · If you're a woman who is or will be pregnant someday, quitting smoking means a healthier . · Children who are close to you are less likely to become adult smokers. Where can you learn more? Go to http://carmel-nicole.info/. Enter [id number] [id number] [id number] in the search box to learn more about \"Learning About Benefits From Quitting Smoking. \"  Current as of: 2018  Content Version: 11.9  © 6442-1362 [employer]. Care instructions adapted under license by [employer] (which disclaims liability or warranty for this information). If you have questions about a medical condition or this instruction, always ask your healthcare professional. [address] any warranty or liability for your use of this information.

## 2019-04-04 NOTE — PROGRESS NOTES
Establish Care       Subjective:   HPI     53year old Mr. Nati Mcdaniel presents post ER visit 3/29/19 for chest pain, right shoulder pain. EKG and chest xray were unremarkable. He was prescribed Robaxin and Prednisone and given a steroid injection. He reports he is feeling much better. He reports hx of neuropathy. He reports the following history and medical concerns:  He reports a hx of Hep C and HIV, and hx of depression and insomnia. He requests medication refills. Past Medical History:   Diagnosis Date    [facility] ([facility]     2001    Liver disease     Hep C treated 7/2016    Psychotic disorder ([facility] 2014    Depression       Past Surgical History:   Procedure Laterality Date    HX LYMPH NODE DISSECTION Right 2001    Axilla    HX WISDOM TEETH EXTRACTION Bilateral 1995       Prior to Admission medications    Medication Sig Start Date End Date Taking? Authorizing Provider   gabapentin (NEURONTIN) 300 mg capsule Take 1 Cap by mouth three (3) times daily. 4/4/19  Yes Aleisha Plaza NP   traZODone (DESYREL) 50 mg tablet Take 1 Tab by mouth nightly. 4/4/19  Yes Aleisha Plaza NP   sertraline (ZOLOFT) 100 mg tablet Take 1 Tab by mouth daily. 4/4/19  Yes Aleisha Plaza NP   varenicline (CHANTIX STARTER JC) 0.5 mg (11)- 1 mg (42) DsPk Take as directed 4/4/19  Yes Aleisha Plaza NP   dolutegravir sodium (TIVICAY PO) Take  by mouth. Yes Other, MD Naina   methocarbamol (ROBAXIN-750) 750 mg tablet Take 1 Tab by mouth three (3) times daily. 3/29/19  Yes RACHANA Freeman   acetaminophen (TYLENOL) 325 mg tablet Take 2 Tabs by mouth every six (6) hours as needed for Pain. 3/29/19  Yes RACHANA Freeman   ODEFSEY 266-38-64 mg tab  3/4/17  Yes Provider, Shazia   predniSONE (DELTASONE) 20 mg tablet Take 60 mg by mouth daily for 5 days.  3/29/19 4/3/19  RACHANA Freeman         Allergies   Allergen Reactions    Abacavir Other (comments)     Interaction with other medication Social History     Socioeconomic History    Marital status: SINGLE     Spouse name: Not on file    Number of children: Not on file    Years of education: Not on file    Highest education level: Not on file   Occupational History    Not on file   Social Needs    Financial resource strain: Not on file    Food insecurity:     Worry: Not on file     Inability: Not on file    Transportation needs:     Medical: Not on file     Non-medical: Not on file   Tobacco Use    Smoking status: Current Every Day Smoker     Packs/day: 1.00     Years: 30.00     Pack years: 30.00     Types: Cigarettes    Smokeless tobacco: Never Used   Substance and Sexual Activity    Alcohol use: Yes     Comment: rarely    Drug use: No    Sexual activity: Not Currently     Partners: Male     Birth control/protection: None   Lifestyle    Physical activity:     Days per week: Not on file     Minutes per session: Not on file    Stress: Not on file   Relationships    Social connections:     Talks on phone: Not on file     Gets together: Not on file     Attends Christian service: Not on file     Active member of club or organization: Not on file     Attends meetings of clubs or organizations: Not on file     Relationship status: Not on file    Intimate partner violence:     Fear of current or ex partner: Not on file     Emotionally abused: Not on file     Physically abused: Not on file     Forced sexual activity: Not on file   Other Topics Concern    Not on file   Social History Narrative    Not on file        Family History   Problem Relation Age of Onset    No Known Problems Mother     No Known Problems Father     No Known Problems Sister         Review of Systems   Constitutional: Negative for chills, diaphoresis, fever, malaise/fatigue and weight loss. HENT: Negative for congestion, ear discharge, ear pain, nosebleeds, sinus pain and sore throat.     Eyes: Negative for blurred vision, double vision, photophobia, pain, discharge and redness. Respiratory: Negative for cough, shortness of breath and wheezing. Cardiovascular: Negative for chest pain and palpitations. Gastrointestinal: Negative for abdominal pain, constipation, diarrhea, heartburn, nausea and vomiting. Genitourinary: Negative for dysuria. Musculoskeletal: Positive for back pain and joint pain. Right shoulder pain has improved. Hx of back pain   Skin: Negative for itching and rash. Neurological: Positive for tingling and sensory change. Negative for dizziness, speech change, focal weakness, weakness and headaches. Hx of neuropathy. Psychiatric/Behavioral: Positive for depression. Negative for hallucinations, substance abuse and suicidal ideas. The patient has insomnia. The patient is not nervous/anxious. Assessment/ Plan:     Vitals:    04/04/19 0909   BP: 106/65   Pulse: 62   Resp: 16   Temp: 97.3 °F (36.3 °C)   TempSrc: Oral   SpO2: 99%   Weight: 139 lb 1.6 oz (63.1 kg)   Height: 5' 7\" (1.702 m)   PainSc:   0 - No pain        Physical Exam   Constitutional: He appears well-nourished. HENT:   Head: Normocephalic and atraumatic. Eyes: Pupils are equal, round, and reactive to light. Conjunctivae are normal.   Neck: Normal range of motion. Neck supple. No thyromegaly present. Cardiovascular: Regular rhythm, normal heart sounds and intact distal pulses. Pulmonary/Chest: Effort normal and breath sounds normal. No respiratory distress. Abdominal: Soft. Bowel sounds are normal.   Musculoskeletal:   Decreased ROM to right shoulder due to minimal pain that has improved. Lymphadenopathy:     He has no cervical adenopathy. Neurological: He is alert. Skin: Skin is warm and dry. Psychiatric: He has a normal mood and affect. Nursing note and vitals reviewed. ICD-10-CM ICD-9-CM    1.  Encounter for medical examination to establish care Z00.00 V70.9 PSA, DIAGNOSTIC (PROSTATE SPECIFIC AG)      AMB POC URINALYSIS DIP STICK AUTO W/O MICRO      AMB POC LIPID PROFILE      AMB POC HEMOGLOBIN A1C   2. Mild episode of recurrent major depressive disorder (HCC) F33.0 296.31 sertraline (ZOLOFT) 100 mg tablet   3. Insomnia, unspecified type G47.00 780.52 traZODone (DESYREL) 50 mg tablet   4. Neuropathy G62.9 355.9 gabapentin (NEURONTIN) 300 mg capsule   5. Chronic bilateral low back pain with right-sided sciatica M54.41 724.2 gabapentin (NEURONTIN) 300 mg capsule    G89.29 724.3      338.29    6. Encounter for smoking cessation counseling Z71.6 V65.42 varenicline (CHANTIX STARTER JC) 0.5 mg (11)- 1 mg (42) DsPk     305.1    7. Screening for prostate cancer Z12.5 V76.44 PSA, DIAGNOSTIC (PROSTATE SPECIFIC AG)   8. Screening for diabetes mellitus Z13.1 V77.1 AMB POC HEMOGLOBIN A1C   9. Screening for cholesterol level Z13.220 V77.91 AMB POC LIPID PROFILE   10. [facility] ([facility] D89.89 279.49         Orders Placed This Encounter    PROSTATE SPECIFIC AG    AMB POC URINALYSIS DIP STICK AUTO W/O MICRO    AMB POC LIPID PROFILE    AMB POC HEMOGLOBIN A1C    gabapentin (NEURONTIN) 300 mg capsule     Sig: Take 1 Cap by mouth three (3) times daily. Dispense:  90 Cap     Refill:  3    traZODone (DESYREL) 50 mg tablet     Sig: Take 1 Tab by mouth nightly. Dispense:  30 Tab     Refill:  5    sertraline (ZOLOFT) 100 mg tablet     Sig: Take 1 Tab by mouth daily. Dispense:  30 Tab     Refill:  5    varenicline (CHANTIX STARTER JC) 0.5 mg (11)- 1 mg (42) DsPk     Sig: Take as directed     Dispense:  1 Dose Pack     Refill:  3        Assessment/ Plan:     I have reviewed the patient's medical history in detail and updated the computerized patient record. Reviewed and discussed lab results. PSA ordered. Medications refilled. Advised him to keep appt with neurosurgeon Dr. Martha Freedman on 4/24/19. We had a prolonged discussion about these complex clinical issues and went over the various important aspects to consider.  All questions were answered. Advised him to call back, return to office, or go to the ER if symptoms do not improve, change in nature, or persist, otherwise f/i in about one month to assess neuropathic pain; shoulder pain. He was given an after visit summary or informed of [facility] which includes patient instructions, diagnoses, current medications, & vitals. He expressed understanding with the diagnosis and plan and was given the opportunity to ask questions.      Chris Selby, DNP

## 2019-04-05 LAB — PSA SERPL-MCNC: 0.4 NG/ML (ref 0–4)

## 2019-04-24 ENCOUNTER — OFFICE VISIT (OUTPATIENT)
Dept: NEUROLOGY | Age: 49
End: 2019-04-24

## 2019-04-24 VITALS
HEIGHT: 67 IN | OXYGEN SATURATION: 98 % | SYSTOLIC BLOOD PRESSURE: 140 MMHG | DIASTOLIC BLOOD PRESSURE: 80 MMHG | WEIGHT: 141 LBS | BODY MASS INDEX: 22.13 KG/M2 | HEART RATE: 82 BPM

## 2019-04-24 DIAGNOSIS — M54.50 CHRONIC MIDLINE LOW BACK PAIN WITHOUT SCIATICA: ICD-10-CM

## 2019-04-24 DIAGNOSIS — G89.29 CHRONIC MIDLINE LOW BACK PAIN WITHOUT SCIATICA: ICD-10-CM

## 2019-04-24 DIAGNOSIS — M54.12 CERVICAL RADICULOPATHY: Primary | ICD-10-CM

## 2019-04-24 DIAGNOSIS — F33.41 RECURRENT MAJOR DEPRESSIVE DISORDER, IN PARTIAL REMISSION (HCC): ICD-10-CM

## 2019-04-24 NOTE — PROGRESS NOTES
NEUROLOGY HISTORY AND PHYSICAL    Name Cesar Melo Age 53 y.o. MRN [id number]  1970     Referring Physician: Jay Francisco NP      Chief Complaint: numbness left arm. This is a 53 y.o. left handed male with a medical history of depression. 2 months ago started having sharp shooting pain and tingling down the left arm. No weakness or muscle loss. He feel numbness in all his fingers. Assessment and Plan  1. Cervical radiculopathy  EMG  MRI of the cervical spine  (has no triceps reflex on the left)    2. Lower back pain   Chronic    3. Depression on zoloft    Patient Allergies  Abacavir     Current Outpatient Medications   Medication Sig    gabapentin (NEURONTIN) 300 mg capsule Take 1 Cap by mouth three (3) times daily.  traZODone (DESYREL) 50 mg tablet Take 1 Tab by mouth nightly.  sertraline (ZOLOFT) 100 mg tablet Take 1 Tab by mouth daily.  varenicline (CHANTIX STARTER JC) 0.5 mg (11)- 1 mg (42) DsPk Take as directed    dolutegravir sodium (TIVICAY PO) Take  by mouth.  acetaminophen (TYLENOL) 325 mg tablet Take 2 Tabs by mouth every six (6) hours as needed for Pain.  ODEFSEY 200-25-25 mg tab     methocarbamol (ROBAXIN-750) 750 mg tablet Take 1 Tab by mouth three (3) times daily. No current facility-administered medications for this visit.         Past Medical History:   Diagnosis Date    Auto[facility] disease ([facility]         Liver disease     Hep C treated 2016    Psychotic disorder ([facility] 2014    Depression       Social History     Tobacco Use    Smoking status: Current Every Day Smoker     Packs/day: 1.00     Years: 30.00     Pack years: 30.00     Types: Cigarettes    Smokeless tobacco: Never Used   Substance Use Topics    Alcohol use: Yes     Comment: rarely       Family History   Problem Relation Age of Onset    No Known Problems Mother     No Known Problems Father     No Known Problems Sister      Review of Systems   Constitutional: Negative for chills and fever.   HENT: Negative for ear pain. Eyes: Negative for pain and discharge. Respiratory: Negative for cough and hemoptysis. Cardiovascular: Negative for chest pain and claudication. Gastrointestinal: Negative for constipation and diarrhea. Genitourinary: Negative for flank pain and hematuria. Musculoskeletal: Positive for myalgias and neck pain. Negative for back pain. Skin: Negative for itching and rash. Neurological: Negative for dizziness and headaches. Endo/Heme/Allergies: Negative for environmental allergies. Does not bruise/bleed easily. Psychiatric/Behavioral: Negative for depression and hallucinations. Exam  Visit Vitals  /80   Pulse 82   Ht 5' 7\" (1.702 m)   Wt 141 lb (64 kg)   SpO2 98%   BMI 22.08 kg/m²      General: Well developed, well nourished. Patient in no apparent distress   Head: Normocephalic, atraumatic, anicteric sclera   Neck Normal ROM, No thyromegally   Lungs:  Clear to auscultation bilaterally, No wheezes or rubs   Cardiac: Regular rate and rhythm with no murmurs. Abd: Bowel sounds were audible. No tenderness on palpation   Ext: No pedal edema   Skin: Supple no rash     NeurologicExam:  Mental Status: Alert and oriented to person place and time   Speech: Fluent no aphasia or dysarthria. Cranial Nerves:  II - XII Intact   Motor:  Full and symmetric strength of upper and lower proximal and distal muscles. Normal bulk and tone. Reflexes:   Deep tendon reflexes 2+/4 and symmetric. With the exception of absent triceps reflex on the left. Sensory:   Symmetric and intact with no perceived deficits modalities involving small or large fibers. Gait:  Gait is balanced and fluid with normal arm swing. Tremor:   No tremor noted. Cerebellar:  Coordination intact. Neurovascular: No carotid bruits.  No JVD       Imaging    Lab Review  Lab Results   Component Value Date/Time    WBC 6.5 03/29/2019 06:42 PM    HCT 46.0 03/29/2019 06:42 PM    HGB 16.2 03/29/2019 06:42 PM    PLATELET 121 52/74/3315 06:42 PM     Lab Results   Component Value Date/Time    Sodium 142 03/29/2019 06:42 PM    Potassium 3.6 03/29/2019 06:42 PM    Chloride 110 (H) 03/29/2019 06:42 PM    CO2 26 03/29/2019 06:42 PM    Glucose 86 03/29/2019 06:42 PM    BUN 10 03/29/2019 06:42 PM    Creatinine 1.12 03/29/2019 06:42 PM    Calcium 8.4 (L) 03/29/2019 06:42 PM       Lab Results   Component Value Date/Time    Hemoglobin A1c 5.2 07/11/2017 12:38 PM    Hemoglobin A1c (POC) 5.2 04/04/2019 10:25 AM

## 2019-04-24 NOTE — PATIENT INSTRUCTIONS

## 2019-05-07 ENCOUNTER — HOSPITAL ENCOUNTER (OUTPATIENT)
Dept: MRI IMAGING | Age: 49
Discharge: HOME | End: 2019-05-07
Attending: PSYCHIATRY & NEUROLOGY
Payer: MEDICAID

## 2019-05-07 DIAGNOSIS — M54.12 CERVICAL RADICULOPATHY: ICD-10-CM

## 2019-05-07 PROCEDURE — 72141 MRI NECK SPINE W/O DYE: CPT

## 2019-05-09 ENCOUNTER — OFFICE VISIT (OUTPATIENT)
Dept: INTERNAL MEDICINE CLINIC | Age: 49
End: 2019-05-09

## 2019-05-09 VITALS
DIASTOLIC BLOOD PRESSURE: 60 MMHG | RESPIRATION RATE: 16 BRPM | BODY MASS INDEX: 21.36 KG/M2 | HEIGHT: 67 IN | HEART RATE: 65 BPM | TEMPERATURE: 97.8 F | SYSTOLIC BLOOD PRESSURE: 136 MMHG | WEIGHT: 136.1 LBS | OXYGEN SATURATION: 97 %

## 2019-05-09 DIAGNOSIS — M25.562 CHRONIC PAIN OF LEFT KNEE: ICD-10-CM

## 2019-05-09 DIAGNOSIS — M47.22 OSTEOARTHRITIS OF SPINE WITH RADICULOPATHY, CERVICAL REGION: Primary | ICD-10-CM

## 2019-05-09 DIAGNOSIS — Z72.0 TOBACCO USE: ICD-10-CM

## 2019-05-09 DIAGNOSIS — Z71.6 ENCOUNTER FOR SMOKING CESSATION COUNSELING: ICD-10-CM

## 2019-05-09 DIAGNOSIS — G47.00 INSOMNIA, UNSPECIFIED TYPE: ICD-10-CM

## 2019-05-09 DIAGNOSIS — G62.9 NEUROPATHY: ICD-10-CM

## 2019-05-09 DIAGNOSIS — F32.A DEPRESSION, UNSPECIFIED DEPRESSION TYPE: ICD-10-CM

## 2019-05-09 DIAGNOSIS — G89.29 CHRONIC PAIN OF LEFT KNEE: ICD-10-CM

## 2019-05-09 RX ORDER — PREDNISONE 20 MG/1
20 TABLET ORAL 2 TIMES DAILY
Qty: 6 TAB | Refills: 0 | Status: SHIPPED | OUTPATIENT
Start: 2019-05-09 | End: 2019-05-12

## 2019-05-09 RX ORDER — VARENICLINE TARTRATE 1 MG/1
1 TABLET, FILM COATED ORAL 2 TIMES DAILY
Qty: 180 TAB | Refills: 0 | Status: SHIPPED | OUTPATIENT
Start: 2019-05-09 | End: 2019-08-07

## 2019-05-09 RX ORDER — TRAZODONE HYDROCHLORIDE 100 MG/1
100 TABLET ORAL
Qty: 30 TAB | Refills: 3 | Status: SHIPPED | OUTPATIENT
Start: 2019-05-09 | End: 2020-08-20 | Stop reason: ALTCHOICE

## 2019-05-09 NOTE — PROGRESS NOTES
Chief Complaint   Patient presents with    Pain (Chronic)     shoulder;     Peripheral Neuropathy     1. Have you been to the ER, urgent care clinic since your last visit? Hospitalized since your last visit? No    2. Have you seen or consulted any other health care providers outside of the [address] [place] [address] since your last visit? Include any pap smears or colon screening.  No

## 2019-05-09 NOTE — PATIENT INSTRUCTIONS
Chronic Pain: Care Instructions  Your Care Instructions    Chronic pain is pain that lasts a long time (months or even years) and may or may not have a clear cause. It is different from acute pain, which usually does have a clear cause--like an injury or illness--and gets better over time. Chronic pain:  · Lasts over time but may vary from day to day. · Does not go away despite efforts to end it. · May disrupt your sleep and lead to fatigue. · May cause depression or anxiety. · May make your muscles tense, causing more pain. · Can disrupt your work, hobbies, home life, and relationships with friends and family. Chronic pain is a very real condition. It is not just in your head. Treatment can help and usually includes several methods used together, such as medicines, physical therapy, exercise, and other treatments. Learning how to relax and changing negative thought patterns can also help you cope. Chronic pain is complex. Taking an active role in your treatment will help you better manage your pain. Tell your doctor if you have trouble dealing with your pain. You may have to try several things before you find what works best for you. Follow-up care is a key part of your treatment and safety. Be sure to make and go to all appointments, and call your doctor if you are having problems. It's also a good idea to know your test results and keep a list of the medicines you take. How can you care for yourself at home? · Pace yourself. Break up large jobs into smaller tasks. Save harder tasks for days when you have less pain, or go back and forth between hard tasks and easier ones. Take rest breaks. · Relax, and reduce stress. Relaxation techniques such as deep breathing or meditation can help. · Keep moving. Gentle, daily exercise can help reduce pain over the long run. Try low- or no-impact exercises such as walking, swimming, and stationary biking. Do stretches to stay flexible.   · Try heat, cold packs, and massage. · Get enough sleep. Chronic pain can make you tired and drain your energy. Talk with your doctor if you have trouble sleeping because of pain. · Think positive. Your thoughts can affect your pain level. Do things that you enjoy to distract yourself when you have pain instead of focusing on the pain. See a movie, read a book, listen to music, or spend time with a friend. · If you think you are depressed, talk to your doctor about treatment. · Keep a daily pain diary. Record how your moods, thoughts, sleep patterns, activities, and medicine affect your pain. You may find that your pain is worse during or after certain activities or when you are feeling a certain emotion. Having a record of your pain can help you and your doctor find the best ways to treat your pain. · Take pain medicines exactly as directed. ? If the doctor gave you a prescription medicine for pain, take it as prescribed. ? If you are not taking a prescription pain medicine, ask your doctor if you can take an over-the-counter medicine. Reducing constipation caused by pain medicine  · Include fruits, vegetables, beans, and whole grains in your diet each day. These foods are high in fiber. · Drink plenty of fluids, enough so that your urine is light yellow or clear like water. If you have kidney, heart, or liver disease and have to limit fluids, talk with your doctor before you increase the amount of fluids you drink. · If your doctor recommends it, get more exercise. Walking is a good choice. Bit by bit, increase the amount you walk every day. Try for at least 30 minutes on most days of the week. · Schedule time each day for a bowel movement. A daily routine may help. Take your time and do not strain when having a bowel movement. When should you call for help?   Call your doctor now or seek immediate medical care if:    · Your pain gets worse or is out of control.     · You feel down or blue, or you do not enjoy things like you once did. You may be depressed, which is common in people with chronic pain. Depression can be treated.     · You have vomiting or cramps for more than 2 hours.    Watch closely for changes in your health, and be sure to contact your doctor if:    · You cannot sleep because of pain.     · You are very worried or anxious about your pain.     · You have trouble taking your pain medicine.     · You have any concerns about your pain medicine.     · You have trouble with bowel movements, such as:  ? No bowel movement in 3 days. ? Blood in the anal area, in your stool, or on the toilet paper. ? Diarrhea for more than 24 hours. Where can you learn more? Go to http://carmel-nicole.info/. Enter N004 in the search box to learn more about \"Chronic Pain: Care Instructions. \"  Current as of: Elena 3, 2018  Content Version: 11.9  © 5713-6018 [employer]. Care instructions adapted under license by [employer] (which disclaims liability or warranty for this information). If you have questions about a medical condition or this instruction, always ask your healthcare professional. [place] [address] any warranty or liability for your use of this information. Recovering From Depression: Care Instructions  Your Care Instructions    Taking good care of yourself is important as you recover from depression. In time, your symptoms will fade as your treatment takes hold. Do not give up. Instead, focus your energy on getting better. Your mood will improve. It just takes some time. Focus on things that can help you feel better, such as being with friends and family, eating well, and getting enough rest. But take things slowly. Do not do too much too soon. You will begin to feel better gradually. Follow-up care is a key part of your treatment and safety. Be sure to make and go to all appointments, and call your doctor if you are having problems.  It's also a good idea to know your test results and keep a list of the medicines you take. How can you care for yourself at home? Be realistic  · If you have a large task to do, break it up into smaller steps you can handle, and just do what you can. · You may want to put off important decisions until your depression has lifted. If you have plans that will have a major impact on your life, such as marriage, divorce, or a job change, try to wait a bit. Talk it over with friends and loved ones who can help you look at the overall picture first.  · Reaching out to people for help is important. Do not isolate yourself. Let your family and friends help you. Find someone you can trust and confide in, and talk to that person. · Be patient, and be kind to yourself. Remember that depression is not your fault and is not something you can overcome with willpower alone. Treatment is necessary for depression, just like for any other illness. Feeling better takes time, and your mood will improve little by little. Stay active  · Stay busy and get outside. Take a walk, or try some other light exercise. · Talk with your doctor about an exercise program. Exercise can help with mild depression. · Go to a movie or concert. Take part in a Oriental orthodox activity or other social gathering. Go to a ball game. · Ask a friend to have dinner with you. Take care of yourself  · Eat a balanced diet with plenty of fresh fruits and vegetables, whole grains, and lean protein. If you have lost your appetite, eat small snacks rather than large meals. · Avoid drinking alcohol or using illegal drugs. Do not take medicines that have not been prescribed for you. They may interfere with medicines you may be taking for depression, or they may make your depression worse. · Take your medicines exactly as they are prescribed. You may start to feel better within 1 to 3 weeks of taking antidepressant medicine. But it can take as many as 6 to 8 weeks to see more improvement.  If you have questions or concerns about your medicines, or if you do not notice any improvement by 3 weeks, talk to your doctor. · If you have any side effects from your medicine, tell your doctor. Antidepressants can make you feel tired, dizzy, or nervous. Some people have dry mouth, constipation, headaches, sexual problems, or diarrhea. Many of these side effects are mild and will go away on their own after you have been taking the medicine for a few weeks. Some may last longer. Talk to your doctor if side effects are bothering you too much. You might be able to try a different medicine. · Get enough sleep. If you have problems sleeping:  ? Go to bed at the same time every night, and get up at the same time every morning. ? Keep your bedroom dark and quiet. ? Do not exercise after 5:00 p.m.  ? Avoid drinks with caffeine after 5:00 p.m. · Avoid sleeping pills unless they are prescribed by the doctor treating your depression. Sleeping pills may make you groggy during the day, and they may interact with other medicine you are taking. · If you have any other illnesses, such as diabetes, heart disease, or high blood pressure, make sure to continue with your treatment. Tell your doctor about all of the medicines you take, including those with or without a prescription. · Keep the numbers for these national suicide hotlines: 2-286-607-TALK (6-606.551.8561) and 3-485-KZSZANF (6-705.823.6738). If you or someone you know talks about suicide or feeling hopeless, get help right away. When should you call for help? Call 911 anytime you think you may need emergency care. For example, call if:    · You feel like hurting yourself or someone else.     · Someone you know has depression and is about to attempt or is attempting suicide.   [facility] your doctor now or seek immediate medical care if:    · You hear voices.     · Someone you know has depression and:  ? Starts to give away his or her possessions. ?  Uses illegal drugs or drinks alcohol heavily. ? Talks or writes about death, including writing suicide notes or talking about guns, knives, or pills. ? Starts to spend a lot of time alone. ? Acts very aggressively or suddenly appears calm.    Watch closely for changes in your health, and be sure to contact your doctor if:    · You do not get better as expected. Where can you learn more? Go to http://carmel-nicole.info/. Enter A023 in the search box to learn more about \"Recovering From Depression: Care Instructions. \"  Current as of: September 11, 2018  Content Version: 11.9  © 2402-6848 [employer]. Care instructions adapted under license by [employer] (which disclaims liability or warranty for this information). If you have questions about a medical condition or this instruction, always ask your healthcare professional. [address] any warranty or liability for your use of this information. Insomnia: Care Instructions  Your Care Instructions    Insomnia is the inability to sleep well. It is a common problem for most people at some time. Insomnia may make it hard for you to get to sleep, stay asleep, or sleep as long as you need to. This can make you tired and grouchy during the day. It can also make you forgetful, less effective at work, and unhappy. Insomnia can be caused by conditions such as depression or anxiety. Pain can also affect your ability to sleep. When these problems are solved, the insomnia usually clears up. But sometimes bad sleep habits can cause insomnia. If insomnia is affecting your work or your enjoyment of life, you can take steps to improve your sleep. Follow-up care is a key part of your treatment and safety. Be sure to make and go to all appointments, and call your doctor if you are having problems. It's also a good idea to know your test results and keep a list of the medicines you take.   How can you care for yourself at home?  What to avoid  · Do not have drinks with caffeine, such as coffee or black tea, for 8 hours before bed. · Do not smoke or use other types of tobacco near bedtime. Nicotine is a stimulant and can keep you awake. · Avoid drinking alcohol late in the evening, because it can cause you to wake in the middle of the night. · Do not eat a big meal close to bedtime. If you are hungry, eat a light snack. · Do not drink a lot of water close to bedtime, because the need to urinate may wake you up during the night. · Do not read or watch TV in bed. Use the bed only for sleeping and sexual activity. What to try  · Go to bed at the same time every night, and wake up at the same time every morning. Do not take naps during the day. · Keep your bedroom quiet, dark, and cool. · Sleep on a comfortable pillow and mattress. · If watching the clock makes you anxious, turn it facing away from you so you cannot see the time. · If you worry when you lie down, start a worry book. Well before bedtime, write down your worries, and then set the book and your concerns aside. · Try meditation or other relaxation techniques before you go to bed. · If you cannot fall asleep, get up and go to another room until you feel sleepy. Do something relaxing. Repeat your bedtime routine before you go to bed again. · Make your house quiet and calm about an hour before bedtime. Turn down the lights, turn off the TV, log off the computer, and turn down the volume on music. This can help you relax after a busy day. When should you call for help? Watch closely for changes in your health, and be sure to contact your doctor if:    · Your efforts to improve your sleep do not work.     · Your insomnia gets worse.     · You have been feeling down, depressed, or hopeless or have lost interest in things that you usually enjoy. Where can you learn more? Go to http://carmel-nicole.info/.   Enter P513 in the search box to learn more about \"Insomnia: Care Instructions. \"  Current as of: June 28, 2018  Content Version: 11.9  © 3370-5597 [employer]. Care instructions adapted under license by [employer] (which disclaims liability or warranty for this information). If you have questions about a medical condition or this instruction, always ask your healthcare professional. [address] any warranty or liability for your use of this information. Knee Pain or Injury: Care Instructions  Your Care Instructions    Injuries are a common cause of knee problems. Sudden (acute) injuries may be caused by a direct blow to the knee. They can also be caused by abnormal twisting, bending, or falling on the knee. Pain, bruising, or swelling may be severe, and may start within minutes of the injury. Overuse is another cause of knee pain. Other causes are climbing stairs, kneeling, and other activities that use the knee. Everyday wear and tear, especially as you get older, also can cause knee pain. Rest, along with home treatment, often relieves pain and allows your knee to heal. If you have a serious knee injury, you may need tests and treatment. Follow-up care is a key part of your treatment and safety. Be sure to make and go to all appointments, and call your doctor if you are having problems. It's also a good idea to know your test results and keep a list of the medicines you take. How can you care for yourself at home? · Be safe with medicines. Read and follow all instructions on the label. ? If the doctor gave you a prescription medicine for pain, take it as prescribed. ? If you are not taking a prescription pain medicine, ask your doctor if you can take an over-the-counter medicine. · Rest and protect your knee. Take a break from any activity that may cause pain. · Put ice or a cold pack on your knee for 10 to 20 minutes at a time. Put a thin cloth between the ice and your skin.   · Prop up a sore knee on a pillow when you ice it or anytime you sit or lie down for the next 3 days. Try to keep it above the level of your heart. This will help reduce swelling. · If your knee is not swollen, you can put moist heat, a heating pad, or a warm cloth on your knee. · If your doctor recommends an elastic bandage, sleeve, or other type of support for your knee, wear it as directed. · Follow your doctor's instructions about how much weight you can put on your leg. Use a cane, crutches, or a walker as instructed. · Follow your doctor's instructions about activity during your healing process. If you can do mild exercise, slowly increase your activity. · Reach and stay at a healthy weight. Extra weight can strain the joints, especially the knees and hips, and make the pain worse. Losing even a few pounds may help. When should you call for help? Call 911 anytime you think you may need emergency care. For example, call if:    · You have symptoms of a blood clot in your lung (called a pulmonary embolism). These may include:  ? Sudden chest pain. ? Trouble breathing. ? Coughing up blood.    Call your doctor now or seek immediate medical care if:    · You have severe or increasing pain.     · Your leg or foot turns cold or changes color.     · You cannot stand or put weight on your knee.     · Your knee looks twisted or bent out of shape.     · You cannot move your knee.     · You have signs of infection, such as:  ? Increased pain, swelling, warmth, or redness. ? Red streaks leading from the knee. ? Pus draining from a place on your knee. ? A fever.     · You have signs of a blood clot in your leg (called a deep vein thrombosis), such as:  ? Pain in your calf, back of the knee, thigh, or groin. ?  Redness and swelling in your leg or groin.    Watch closely for changes in your health, and be sure to contact your doctor if:    · You have tingling, weakness, or numbness in your knee.     · You have any new symptoms, such as swelling.     · You have bruises from a knee injury that last longer than 2 weeks.     · You do not get better as expected. Where can you learn more? Go to http://carmel-nicole.info/. Enter K195 in the search box to learn more about \"Knee Pain or Injury: Care Instructions. \"  Current as of: September 23, 2018  Content Version: 11.9  © 8591-9371 [employer]. Care instructions adapted under license by [employer] (which disclaims liability or warranty for this information). If you have questions about a medical condition or this instruction, always ask your healthcare professional. [place] [address] any warranty or liability for your use of this information. Neuropathic Pain: Care Instructions  Your Care Instructions    Neuropathic pain is caused by pressure on or damage to your nerves. It's often simply called nerve pain. Some people feel this type of pain all the time. For others, it comes and goes. Diabetes, shingles, or an injury can cause nerve pain. Many people say the pain feels sharp, burning, or stabbing. But some people feel it as a dull ache. In some cases, it makes your skin very sensitive. So touch, pressure, and other sensations that did not hurt before may now cause pain. It's important to know that this kind of pain is real and can affect your quality of life. It's also important to know that treatment can help. Treatment includes pain medicines, exercise, and physical therapy. Medicines can help reduce the number of pain signals that travel over the nerves. This can make the painful areas less sensitive. It can also help you sleep better and improve your mood. But medicines are only one part of successful treatment. Most people do best with more than one kind of treatment. Your doctor may recommend that you try cognitive-behavioral therapy and stress management.  Or, if needed, you may decide to try to quit smoking, lower your blood pressure, or better control blood sugar. These kinds of healthy changes can also make a difference. If you feel that your treatment is not working, talk to your doctor. And be sure to tell your doctor if you think you might be depressed or anxious. These are common problems that can also be treated. Follow-up care is a key part of your treatment and safety. Be sure to make and go to all appointments, and call your doctor if you are having problems. It's also a good idea to know your test results and keep a list of the medicines you take. How can you care for yourself at home? · Be safe with medicines. Read and follow all instructions on the label. ? If the doctor gave you a prescription medicine for pain, take it as prescribed. ? If you are not taking a prescription pain medicine, ask your doctor if you can take an over-the-counter medicine. · Save hard tasks for days when you have less pain. Follow a hard task with an easy task. And remember to take breaks. · Relax, and reduce stress. You may want to try deep breathing or meditation. These can help. · Keep moving. Gentle, daily exercise can help reduce pain. Your doctor or physical therapist can tell you what type of exercise is best for you. This may include walking, swimming, and stationary biking. It may also include stretches and range-of-motion exercises. · Try heat, cold packs, and massage. · Get enough sleep. Constant pain can make you more tired. If the pain makes it hard to sleep, talk with your doctor. · Think positively. Your thoughts can affect your pain. Do fun things to distract yourself from the pain. See a movie, read a book, listen to music, or spend time with a friend. · Keep a pain diary. Try to write down how strong your pain is and what it feels like. Also try to notice and write down how your moods, thoughts, sleep, activities, and medicine affect your pain.  These notes can help you and your doctor find the best ways to treat your pain. Reducing constipation caused by pain medicine  Pain medicines often cause constipation. To reduce constipation:  · Include fruits, vegetables, beans, and whole grains in your diet each day. These foods are high in fiber. · Drink plenty of fluids, enough so that your urine is light yellow or clear like water. If you have kidney, heart, or liver disease and have to limit fluids, talk with your doctor before you increase the amount of fluids you drink. · Get some exercise every day. Build up slowly to 30 to 60 minutes a day on 5 or more days of the week. · Take a fiber supplement, such as Citrucel or Metamucil, every day if needed. Read and follow all instructions on the label. · Schedule time each day for a bowel movement. Having a daily routine may help. Take your time and do not strain when having a bowel movement. · Ask your doctor about a laxative. The goal is to have one easy bowel movement every 1 to 2 days. Do not let constipation go untreated for more than 3 days. When should you call for help? Call your doctor now or seek immediate medical care if:    · You feel sad, anxious, or hopeless for more than a few days. This could mean you are depressed. Depression is common in people who have a lot of pain. But it can be treated.     · You have trouble with bowel movements, such as:  ? No bowel movement in 3 days. ? Blood in the anal area, in your stool, or on the toilet paper. ? Diarrhea for more than 24 hours.    Watch closely for changes in your health, and be sure to contact your doctor if:    · Your pain is getting worse.     · You can't sleep because of pain.     · You are very worried or anxious about your pain.     · You have trouble taking your pain medicine.     · You have any concerns about your pain medicine or its side effects.     · You have vomiting or cramps for more than 2 hours. Where can you learn more?   Go to http://carmel-nicole.info/. Enter B278 in the search box to learn more about \"Neuropathic Pain: Care Instructions. \"  Current as of: Elena 3, 2018  Content Version: 11.9  © 4227-1075 [employer]. Care instructions adapted under license by [employer] (which disclaims liability or warranty for this information). If you have questions about a medical condition or this instruction, always ask your healthcare professional. [address] any warranty or liability for your use of this information. Learning About Benefits From Quitting Smoking  How does quitting smoking make you healthier? If you're thinking about quitting smoking, you may have a few reasons to be smoke-free. Your health may be one of them. · When you quit smoking, you lower your risks for cancer, lung disease, heart attack, stroke, blood vessel disease, and blindness from macular degeneration. · When you're smoke-free, you get sick less often, and you heal faster. You are less likely to get colds, flu, bronchitis, and pneumonia. · As a nonsmoker, you may find that your mood is better and you are less stressed. When and how will you feel healthier? Quitting has real health benefits that start from day 1 of being smoke-free. And the longer you stay smoke-free, the healthier you get and the better you feel. The first hours  · After just 20 minutes, your blood pressure and heart rate go down. That means there's less stress on your heart and blood vessels. · Within 12 hours, the level of carbon monoxide in your blood drops back to normal. That makes room for more oxygen. With more oxygen in your body, you may notice that you have more energy than when you smoked. After 2 weeks  · Your lungs start to work better. · Your risk of heart attack starts to drop. After 1 month  · When your lungs are clear, you cough less and breathe deeper, so it's easier to be active.   · Your sense of taste and smell return. That means you can enjoy food more than you have since you started smoking. Over the years  · After 1 year, your risk of heart disease is half what it would be if you kept smoking. · After 5 years, your risk of stroke starts to shrink. Within a few years after that, it's about the same as if you'd never smoked. · After 10 years, your risk of dying from lung cancer is cut by about half. And your risk for many other types of cancer is lower too. How would quitting help others in your life? When you quit smoking, you improve the health of everyone who now breathes in your smoke. · Their heart, lung, and cancer risks drop, much like yours. · They are sick less. For babies and small children, living smoke-free means they're less likely to have ear infections, pneumonia, and bronchitis. · If you're a woman who is or will be pregnant someday, quitting smoking means a healthier . · Children who are close to you are less likely to become adult smokers. Where can you learn more? Go to http://carmel-nicole.info/. Enter [id number] [id number] [id number] 11 in the search box to learn more about \"Learning About Benefits From Quitting Smoking. \"  Current as of: 2018  Content Version: 11.9  © 9688-6160 [employer]. Care instructions adapted under license by [employer] (which disclaims liability or warranty for this information). If you have questions about a medical condition or this instruction, always ask your healthcare professional. [place] [address] any warranty or liability for your use of this information.

## 2019-05-09 NOTE — PROGRESS NOTES
Pain (Chronic) (shoulder; ); Peripheral Neuropathy; and Medication Refill (chantix- fill for medication other than starter pack)       Subjective:   HPI   53year old Mr. Guerline Porter presents for f/u neck and shoulder pain; neuropathy, insomnia and medication for smoking cessation. He has had an MRI and consult with neurology; further testing is pending. He would like to wait until this testing is complete before deciding on other therapies such as physical therapy. He c/o Trazodone 50 mg is not helping with sleep. He has been doubling the dose (100 mg) and this seems to be effective. He has completed the starter kb of Chantix and needs to dosage for maintenance. He c/o chronic left knee pain which is worsening. Past Medical History:   Diagnosis Date    Autoimmune disease ([facility] [address]     2001    Liver disease     Hep C treated 7/2016    Psychotic disorder ([facility] 2014    Depression    Tobacco use        Past Surgical History:   Procedure Laterality Date    HX LYMPH NODE DISSECTION Right 2001    Axilla    HX WISDOM TEETH EXTRACTION Bilateral 1995       Prior to Admission medications    Medication Sig Start Date End Date Taking? Authorizing Provider   traZODone (DESYREL) 100 mg tablet Take 1 Tab by mouth nightly. 5/9/19  Yes Aleisha Plaza NP   varenicline (CHANTIX) 1 mg tablet Take 1 Tab by mouth two (2) times a day for 90 days. 5/9/19 8/7/19 Yes Aleisha Plaza NP   predniSONE (DELTASONE) 20 mg tablet Take 20 mg by mouth two (2) times a day for 3 days. 5/9/19 5/12/19 Yes Aleisha Plaza NP   gabapentin (NEURONTIN) 300 mg capsule Take 1 Cap by mouth three (3) times daily. 4/4/19  Yes Aleisha Plaza NP   sertraline (ZOLOFT) 100 mg tablet Take 1 Tab by mouth daily. 4/4/19  Yes Aleisha Plaza NP   dolutegravir sodium (TIVICAY PO) Take  by mouth. Yes Naina Lira MD   acetaminophen (TYLENOL) 325 mg tablet Take 2 Tabs by mouth every six (6) hours as needed for Pain.  3/29/19  Yes RACHANA Causey ODEFSEY 200-25-25 mg tab  3/4/17  Yes Provider, Historical        Allergies   Allergen Reactions    Abacavir Other (comments)     Interaction with other medication        Social History     Socioeconomic History    Marital status: SINGLE     Spouse name: Not on file    Number of children: Not on file    Years of education: Not on file    Highest education level: Not on file   Occupational History    Not on file   Social Needs    Financial resource strain: Not on file    Food insecurity:     Worry: Not on file     Inability: Not on file    Transportation needs:     Medical: Not on file     Non-medical: Not on file   Tobacco Use    Smoking status: Current Every Day Smoker     Packs/day: 1.00     Years: 30.00     Pack years: 30.00     Types: Cigarettes    Smokeless tobacco: Never Used   Substance and Sexual Activity    Alcohol use: Yes     Comment: rarely    Drug use: No    Sexual activity: Not Currently     Partners: Male     Birth control/protection: None   Lifestyle    Physical activity:     Days per week: Not on file     Minutes per session: Not on file    Stress: Not on file   Relationships    Social connections:     Talks on phone: Not on file     Gets together: Not on file     Attends [employer]ism service: Not on file     Active member of club or organization: Not on file     Attends meetings of clubs or organizations: Not on file     Relationship status: Not on file    Intimate partner violence:     Fear of current or ex partner: Not on file     Emotionally abused: Not on file     Physically abused: Not on file     Forced sexual activity: Not on file   Other Topics Concern    Not on file   Social History Narrative    Not on file        Family History   Problem Relation Age of Onset    No Known Problems Mother     No Known Problems Father     No Known Problems Sister           Review of Systems   Constitutional: Negative for fever and malaise/fatigue.    HENT: Negative for congestion, ear discharge, ear pain, sinus pain and sore throat. Eyes: Negative for blurred vision. Respiratory: Negative for cough, shortness of breath and wheezing. Cardiovascular: Negative for chest pain and palpitations. Gastrointestinal: Negative for nausea and vomiting. Genitourinary: Negative for dysuria. Musculoskeletal: Positive for joint pain and neck pain. Negative for myalgias. Skin: Negative for rash. Neurological: Positive for tingling. Negative for dizziness, tremors, sensory change, speech change and headaches. Psychiatric/Behavioral: Positive for depression. Negative for suicidal ideas. The patient has insomnia. Objective:     Vitals:    05/09/19 0814   BP: 136/60   Pulse: 65   Resp: 16   Temp: 97.8 °F (36.6 °C)   TempSrc: Oral   SpO2: 97%   Weight: 136 lb 1.6 oz (61.7 kg)   Height: 5' 7\" (1.702 m)   PainSc:  10 - Worst pain ever   PainLoc: Knee        Physical Exam   Constitutional: He is oriented to person, place, and time. He appears well-nourished. HENT:   Head: Normocephalic and atraumatic. Eyes: Pupils are equal, round, and reactive to light. Conjunctivae and EOM are normal. Right eye exhibits no discharge. Left eye exhibits no discharge. Neck: Normal range of motion. Neck supple. No thyromegaly present. Cardiovascular: Regular rhythm and normal heart sounds. Pulmonary/Chest: Effort normal and breath sounds normal. No respiratory distress. He has no wheezes. He exhibits no tenderness. Abdominal: Soft. Bowel sounds are normal. He exhibits no distension. Musculoskeletal: He exhibits edema and tenderness. Decreased ROM to the left knee. Lymphadenopathy:     He has no cervical adenopathy. Neurological: He is alert and oriented to person, place, and time. Skin: Skin is warm and dry. Psychiatric: He has a normal mood and affect. Assessment/ Plan:       ICD-10-CM ICD-9-CM    1.  Osteoarthritis of spine with radiculopathy, cervical region M47.22 721.0 predniSONE (DELTASONE) 20 mg tablet   2. Neuropathy G62.9 355.9    3. Insomnia, unspecified type G47.00 780.52 traZODone (DESYREL) 100 mg tablet   4. Depression, unspecified depression type F32.9 311    5. Chronic pain of left knee M25.562 719.46 XR KNEE LT MAX 2 VWS    G89.29 338.29 predniSONE (DELTASONE) 20 mg tablet   6. Tobacco use Z72.0 305.1 varenicline (CHANTIX) 1 mg tablet   7. Encounter for smoking cessation counseling Z71.6 V65.42 varenicline (CHANTIX) 1 mg tablet     305.1         Orders Placed This Encounter    XR KNEE LT MAX 2 VWS     Standing Status:   Future     Standing Expiration Date:   6/9/2020     Order Specific Question:   Reason for Exam     Answer:   left knee pain    traZODone (DESYREL) 100 mg tablet     Sig: Take 1 Tab by mouth nightly. Dispense:  30 Tab     Refill:  3    varenicline (CHANTIX) 1 mg tablet     Sig: Take 1 Tab by mouth two (2) times a day for 90 days. Dispense:  180 Tab     Refill:  0    predniSONE (DELTASONE) 20 mg tablet     Sig: Take 20 mg by mouth two (2) times a day for 3 days. Dispense:  6 Tab     Refill:  0        I have reviewed the patient's medical history in detail and updated the computerized patient record. Left knee pain with mild edema. Xray of the left knee. Prednisone 20 mg.1 tab 2 times a day for 3 days. Continue f/u with neurology for cervical spine/neck DJD. Chantix maintenance dose prescribed for smoking cessation. Increased Trazodone to 100 mg every night. We had a prolonged discussion about these complex clinical issues and went over the various important aspects to consider. All questions were answered. Advised him to call back, return to office, or go to the ER if symptoms do not improve, change in nature, or persist, otherwise f/u in 3 months for chronic neck and left knee pain; insomnia. Will contact with xray results.     He was given an after visit summary or informed of [employer] which includes patient instructions, diagnoses, current medications, & vitals. He expressed understanding with the diagnosis and plan and was given the opportunity to ask questions.     Chris Selby, DNP

## 2019-05-14 NOTE — PROGRESS NOTES
Has degenerative disc disease with tightening of the cervical canal.  Possible areas of concerns are  \"pinching\" at c5-6 on the left and \"pinching\" on both sides at C6-7.

## 2019-06-11 ENCOUNTER — OFFICE VISIT (OUTPATIENT)
Dept: NEUROLOGY | Age: 49
End: 2019-06-11

## 2019-06-11 VITALS
SYSTOLIC BLOOD PRESSURE: 96 MMHG | HEART RATE: 61 BPM | WEIGHT: 136 LBS | DIASTOLIC BLOOD PRESSURE: 61 MMHG | BODY MASS INDEX: 21.35 KG/M2 | HEIGHT: 67 IN | OXYGEN SATURATION: 97 %

## 2019-06-11 DIAGNOSIS — M54.12 CERVICAL RADICULOPATHY: Primary | ICD-10-CM

## 2019-06-11 NOTE — PROCEDURES
[facility]        Tel: [phone] ? Fax: [phone]    ELECTRODIAGNOSTIC REPORT      Test Date:  2019    Patient: Bob Kent : 1970 Physician: Nael Viera   ID#: [id number] SEX: Male Ref. Phys: Viktor Villalobos M.D. Patient History / Exam:  Left arm shooting pains. Exam reveals no left tricep reflex otherwise negative. Assess for neuropathy vs cervical radiculopathy. EMG & NCV Findings:  Sensory and nerve conduction studies (as indicated in the tables) were within reference of normal.    Disposable concentric needle EMG (as indicated in the table) was normal except for irritability left tricep, anconeus, FCR and lower cervical paraspinal muscles. No overt denervation or neuropathic recruitment changes. Impression: This study is within normal limits. There is a suggestion of an early left C7 nerve root irritation but no overt radiculopathy changes yet. No evidence of an entrapment neuropathy, generalized neuropathy or myopathy at this time.     Nael Viera MD    Nerve Conduction Studies  Anti Sensory Summary Table     Stim Site NR Peak (ms) Norm Peak (ms) P-T Amp (µV) Norm P-T Amp Site1 Site2 Dist (cm)   Left Median Anti Sensory (2nd Digit)  32.8°C   Wrist    3.0 <4 51.9 >13 Wrist 2nd Digit 14.0   Left Radial Anti Sensory (Base 1st Digit)  32.2°C   Wrist    2.4 <2.8 23.5 >11 Wrist Base 1st Digit 10.0   Left Ulnar Anti Sensory (5th Digit)  32.5°C   Wrist    3.4 <4.0 31.4 >9 Wrist 5th Digit 14.0     Motor Summary Table     Stim Site NR Onset (ms) Norm Onset (ms) O-P Amp (mV) Norm O-P Amp P-T Amp (mV) Site1 Site2 Dist (cm) Ravi (m/s)   Left Median Motor (Abd Poll Brev)  32.4°C   Wrist    3.4 <4.5 7.7 >4.1 13.3 Wrist Abd Poll Brev 8.0    Elbow    7.5  7.7  13.4 Elbow Wrist 23.0 56   Left Ulnar Motor (Abd Dig Minimi)  32.4°C   Wrist    2.7 <3.1 7.8 >7.0 13.9 Wrist Abd Dig Minimi 8.0    B Elbow    6.2  8.1  14.0 B Elbow Wrist 20.5 59   A Elbow 8.0  8.3  14.2 A Elbow B Elbow 10.0 56       EMG     Side Muscle Nerve Root Ins Act Fibs Psw Recrt Duration Amp Poly Comment   Left Deltoid Axillary C5-6 Nml Nml Nml Nml Nml Nml Nml    Left Triceps Radial C6-7-8 Incr Nml Nml Nml Nml Nml Nml    Left Biceps Musculocut C5-6 Nml Nml Nml Nml Nml Nml Nml    Left 1stDorInt Ulnar C8-T1 Nml Nml Nml Nml Nml Nml Nml    Left FlexCarRad Median C6-7 Incr Nml Nml Nml Nml Nml Nml    Left Anconeus Radial C7-8 Incr Nml Nml Nml Nml Nml Nml    Left Lower Cerv Parasp Rami C7,T1 Incr Nml Nml Nml Nml Nml Nml    Left Mid Cerv Parasp Rami C5,6 Nml Nml Nml Nml Nml Nml Nml    Left Upper Cerv Parasp Rami C3,4 Nml Nml Nml Nml Nml Nml Nml      Waveforms:

## 2019-07-08 NOTE — PROGRESS NOTES
Arthritic changes at multiple levels and tight canal. No need to see surgeon unless pain is severe or excessive weakness but needs at least yearly follow up.

## 2019-07-18 ENCOUNTER — TELEPHONE (OUTPATIENT)
Dept: NEUROLOGY | Age: 49
End: 2019-07-18

## 2019-07-18 NOTE — TELEPHONE ENCOUNTER
----- Message from Salvador Adames sent at 7/18/2019 12:58 PM EDT -----  Regarding: Dr. Nirmala Stroud  Appointment not available    Caller's first and last name and relationship to patient (if not the patient):  pt    Best contact number:  [phone]    Preferred date and time:  Any day after Thursday 08/01/2019    Scheduled appointment date and time:  Cancelled appt Friday 07/26/2019    Reason for appointment:  Victor Manuel Cartwright due to insurance    Details to clarify the request:      Salvador Adames

## 2020-08-20 ENCOUNTER — VIRTUAL VISIT (OUTPATIENT)
Dept: FAMILY MEDICINE CLINIC | Age: 50
End: 2020-08-20
Payer: MEDICAID

## 2020-08-20 DIAGNOSIS — Z12.11 COLON CANCER SCREENING: ICD-10-CM

## 2020-08-20 DIAGNOSIS — N28.1 BILATERAL RENAL CYSTS: ICD-10-CM

## 2020-08-20 DIAGNOSIS — Z00.00 WELL ADULT EXAM: ICD-10-CM

## 2020-08-20 DIAGNOSIS — F41.9 ANXIETY: ICD-10-CM

## 2020-08-20 DIAGNOSIS — F51.01 PRIMARY INSOMNIA: ICD-10-CM

## 2020-08-20 DIAGNOSIS — Z21 ASYMPTOMATIC HIV INFECTION (HCC): Primary | ICD-10-CM

## 2020-08-20 DIAGNOSIS — E78.2 MIXED HYPERLIPIDEMIA: ICD-10-CM

## 2020-08-20 PROCEDURE — 99443 PR PHYS/QHP TELEPHONE EVALUATION 21-30 MIN: CPT | Performed by: FAMILY MEDICINE

## 2020-08-20 RX ORDER — PRAVASTATIN SODIUM 40 MG/1
40 TABLET ORAL DAILY
Qty: 90 TAB | Refills: 1 | Status: SHIPPED | OUTPATIENT
Start: 2020-08-20 | End: 2022-02-23 | Stop reason: ALTCHOICE

## 2020-08-20 RX ORDER — ESCITALOPRAM OXALATE 10 MG/1
TABLET ORAL
Qty: 90 TAB | Refills: 0 | Status: SHIPPED | OUTPATIENT
Start: 2020-08-20 | End: 2022-01-10 | Stop reason: ALTCHOICE

## 2020-08-20 RX ORDER — TRAZODONE HYDROCHLORIDE 100 MG/1
100 TABLET ORAL
Qty: 90 TAB | Refills: 1 | Status: SHIPPED | OUTPATIENT
Start: 2020-08-20 | End: 2022-01-10 | Stop reason: SDUPTHER

## 2020-08-20 RX ORDER — PRAVASTATIN SODIUM 40 MG/1
TABLET ORAL
COMMUNITY
Start: 2020-07-24 | End: 2020-08-20 | Stop reason: SDUPTHER

## 2020-08-20 RX ORDER — ERGOCALCIFEROL 1.25 MG/1
50000 CAPSULE ORAL
COMMUNITY
Start: 2020-07-24 | End: 2022-01-10 | Stop reason: ALTCHOICE

## 2020-08-20 NOTE — PROGRESS NOTES
Lavon Gale is a 53 y.o. male, evaluated via audio-only technology on 2020 for Medication Refill (Last seen 2 years ago)  . Assessment & Plan:   Diagnoses and all orders for this visit:    1. Asymptomatic HIV infection (HCC)rechecking labs, patient continues to follow with ALONSO Juan at [facility] for ID  -     HIV-1 RNA QT BY PCR  -     LYMPHOCYTES, CD4 PERCENT AND ABSOLUTE    2. Anxiety switching from Zoloft to Lexapro to help with anxiety but avoid daily headaches  -     escitalopram oxalate (LEXAPRO) 10 mg tablet; Take 1/2 tab by mouth daily x 1 week and then increase to 1 tab daily    3. Primary insomnia improvement with trazodone so we will continue this  -     traZODone (DESYREL) 100 mg tablet; Take 1 Tab by mouth nightly. 4. Bilateral renal cysts patient reports history of this so we will check ultrasound to evaluate further  -     METABOLIC PANEL, COMPREHENSIVE  -     URINALYSIS W/ RFLX MICROSCOPIC  -     US RETROPERITONEUM COMP; Future    5. Mixed hyperlipidemia continue Pravachol and checking labs  -     pravastatin (PRAVACHOL) 40 mg tablet; Take 1 Tab by mouth daily. 6. Colon cancer screening  -     REFERRAL TO COLON AND RECTAL SURGERY    7. Well adult exam  -     HEMOGLOBIN A1C WITH EAG  -     LIPID PANEL  -     METABOLIC PANEL, COMPREHENSIVE  -     TSH 3RD GENERATION  -     CBC W/O DIFF  -     URINALYSIS W/ RFLX MICROSCOPIC  -     HIV-1 RNA QT BY PCR  -     LYMPHOCYTES, CD4 PERCENT AND ABSOLUTE    Follow-up and Dispositions    · Return in about 3 months (around 2020). 12  Subjective:     Patient has been lost to follow-up for about 2 years. He tells me that during that time he lost his job and became homeless for short period. After this, he found out that both of his parents  from different issues in the past year. He has grieved in a healthy way and depression seems to be improving but his anxiety is constant.   He is interested in restarting medications for anxiety and depression. He was previously on Zoloft every day which seemed to help with the symptoms but did cause headaches. He would like to try an alternative option. He also is taking trazodone nightly which seemed to help significantly with his sleep. HIV -has continued to follow with his ID NP, William An, at [facility]. Doing well with [facility] and Colby Ash but has not had his numbers checked since 10/2020. Of note he also has a history of hepatitis C that was treated with Harvoni back in 2016. There was concern for cirrhosis stage III. He also reports having bilateral cysts of kidneys. This was being followed annually with his last ultrasound about 3 years ago. He does not remember who is ordering these scans but would like to have a new ultrasound to help evaluate. Notes reviewed in chart as well and saw neurology for cervical radiculopathy. Had MRI and 5/2019 which showed mild to moderate canal and moderate left foraminal stenosis at C5-C6. Also had moderate bilateral lateral foraminal stenosis at C6-C7 and mild canal stenosis at C3 5 and C6-7. Lastly, he is interested in having a colonoscopy. He reports his Mom was diagnosed with colon cancer before passing away. He also has a sister who had a recent colonoscopy and had polyps. He denies any rectal bleeding. 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 Prior to Admission medications    Medication Sig Start Date End Date Taking? Authorizing Provider   Vitamin D2 1,250 mcg (50,000 unit) capsule Take 50,000 Units by mouth every thirty (30) days. 7/24/20  Yes Provider, Historical   pravastatin (PRAVACHOL) 40 mg tablet Take 1 Tab by mouth daily.  8/20/20  Yes Alberto Becker MD   escitalopram oxalate (LEXAPRO) 10 mg tablet Take 1/2 tab by mouth daily x 1 week and then increase to 1 tab daily 8/20/20  Yes Alberto Becker MD   traZODone (DESYREL) 100 mg tablet Take 1 Tab by mouth nightly. 8/20/20  Yes Prema Sheehan MD   dolutegravir sodium (TIVICAY PO) Take  by mouth. Yes Other, MD NASIMA Chew 200-25-25 mg tab  3/4/17  Yes Provider, Historical   pravastatin (PRAVACHOL) 40 mg tablet  7/24/20 8/20/20  Provider, Historical   traZODone (DESYREL) 100 mg tablet Take 1 Tab by mouth nightly. 5/9/19 8/20/20  Nikolas Conteh NP   gabapentin (NEURONTIN) 300 mg capsule Take 1 Cap by mouth three (3) times daily. 4/4/19 8/20/20  Nikolas Conteh NP   sertraline (ZOLOFT) 100 mg tablet Take 1 Tab by mouth daily. 4/4/19 8/20/20  Didier James NP   acetaminophen (TYLENOL) 325 mg tablet Take 2 Tabs by mouth every six (6) hours as needed for Pain. 3/29/19   RACHANA Alcaraz     Patient Active Problem List   Diagnosis Code    HIV (human immunodeficiency virus infection) ([facility] [address] B20    Hepatitis C virus infection B19.20     Past Medical History:   Diagnosis Date    Autoimmune disease ([facility] [address]     2001    Liver disease     Hep C treated 7/2016    Psychotic disorder ([facility] [address] 2014    Depression    Tobacco use        ROS  Gen - no fever/chills  Resp - no dyspnea or cough  CV - no chest pain or DAVIS  Rest per HPI    Patient-Reported Vitals 8/11/2020   Patient-Reported Weight 127   Patient-Reported Height 5'7\"        Karri Vega, who was evaluated through a patient-initiated, synchronous (real-time) audio only encounter, and/or her healthcare decision maker, is aware that it is a billable service, with coverage as determined by his insurance carrier. He provided verbal consent to proceed: Yes. He has not had a related appointment within my department in the past 7 days or scheduled within the next 24 hours.       Total Time: minutes: 21-30 minutes    Josephine Lyn MD

## 2020-08-28 ENCOUNTER — HOSPITAL ENCOUNTER (OUTPATIENT)
Dept: ULTRASOUND IMAGING | Age: 50
Discharge: HOME | End: 2020-08-28
Attending: FAMILY MEDICINE
Payer: MEDICAID

## 2020-08-28 DIAGNOSIS — N28.1 BILATERAL RENAL CYSTS: ICD-10-CM

## 2020-08-28 PROCEDURE — 76770 US EXAM ABDO BACK WALL COMP: CPT

## 2021-09-08 DIAGNOSIS — F51.01 PRIMARY INSOMNIA: ICD-10-CM

## 2021-09-08 RX ORDER — TRAZODONE HYDROCHLORIDE 100 MG/1
TABLET ORAL
Qty: 90 TABLET | Refills: 1 | OUTPATIENT
Start: 2021-09-08

## 2022-01-10 ENCOUNTER — OFFICE VISIT (OUTPATIENT)
Dept: FAMILY MEDICINE CLINIC | Age: 52
End: 2022-01-10
Payer: MEDICAID

## 2022-01-10 VITALS
TEMPERATURE: 97.8 F | SYSTOLIC BLOOD PRESSURE: 90 MMHG | BODY MASS INDEX: 21.19 KG/M2 | DIASTOLIC BLOOD PRESSURE: 62 MMHG | RESPIRATION RATE: 16 BRPM | WEIGHT: 135 LBS | HEIGHT: 67 IN | OXYGEN SATURATION: 98 % | HEART RATE: 100 BPM

## 2022-01-10 DIAGNOSIS — F51.01 PRIMARY INSOMNIA: ICD-10-CM

## 2022-01-10 DIAGNOSIS — Z72.0 TOBACCO ABUSE: ICD-10-CM

## 2022-01-10 DIAGNOSIS — E78.2 MIXED HYPERLIPIDEMIA: ICD-10-CM

## 2022-01-10 DIAGNOSIS — R76.8 LOW CD4 CELL COUNT DETERMINED BY FLOW CYTOMETRY: ICD-10-CM

## 2022-01-10 DIAGNOSIS — E55.9 VITAMIN D DEFICIENCY: ICD-10-CM

## 2022-01-10 DIAGNOSIS — F43.21 GRIEF: ICD-10-CM

## 2022-01-10 DIAGNOSIS — Z21 ASYMPTOMATIC HIV INFECTION (HCC): ICD-10-CM

## 2022-01-10 DIAGNOSIS — N52.9 ERECTILE DYSFUNCTION, UNSPECIFIED ERECTILE DYSFUNCTION TYPE: ICD-10-CM

## 2022-01-10 DIAGNOSIS — Z00.00 WELL ADULT EXAM: Primary | ICD-10-CM

## 2022-01-10 DIAGNOSIS — B18.2 CHRONIC HEPATITIS C WITHOUT HEPATIC COMA (HCC): ICD-10-CM

## 2022-01-10 DIAGNOSIS — Z12.11 SCREEN FOR COLON CANCER: ICD-10-CM

## 2022-01-10 PROCEDURE — 99396 PREV VISIT EST AGE 40-64: CPT | Performed by: FAMILY MEDICINE

## 2022-01-10 PROCEDURE — 99214 OFFICE O/P EST MOD 30 MIN: CPT | Performed by: FAMILY MEDICINE

## 2022-01-10 RX ORDER — SILDENAFIL 50 MG/1
50 TABLET, FILM COATED ORAL
Qty: 10 TABLET | Refills: 0 | Status: SHIPPED | OUTPATIENT
Start: 2022-01-10

## 2022-01-10 RX ORDER — TRAZODONE HYDROCHLORIDE 100 MG/1
100 TABLET ORAL
Qty: 90 TABLET | Refills: 0 | Status: SHIPPED | OUTPATIENT
Start: 2022-01-10 | End: 2022-04-06

## 2022-01-10 RX ORDER — MIRTAZAPINE 7.5 MG/1
7.5 TABLET, FILM COATED ORAL
Qty: 90 TABLET | Refills: 0 | Status: SHIPPED | OUTPATIENT
Start: 2022-01-10 | End: 2022-02-23 | Stop reason: DRUGHIGH

## 2022-01-10 RX ORDER — DOLUTEGRAVIR SODIUM 50 MG/1
TABLET, FILM COATED ORAL
COMMUNITY
Start: 2022-01-05 | End: 2022-01-10 | Stop reason: SDUPTHER

## 2022-01-10 RX ORDER — ASPIRIN 325 MG
50000 TABLET, DELAYED RELEASE (ENTERIC COATED) ORAL
Qty: 8 CAPSULE | Refills: 0 | Status: SHIPPED | OUTPATIENT
Start: 2022-01-10 | End: 2022-02-28

## 2022-01-10 NOTE — PROGRESS NOTES
Chief Complaint   Patient presents with    Complete Physical     Annual   1. Have you been to the ER, urgent care clinic since your last visit? Hospitalized since your last visit? No    2. Have you seen or consulted any other health care providers outside of the [address] [place] [address] since your last visit? Include any pap smears or colon screening.  Yes Where: [facility] [place]

## 2022-01-10 NOTE — PROGRESS NOTES
Subjective:     Chief Complaint   Patient presents with    Complete Physical     Annual      He  is a 49 y.o. male who presents for evaluation of: CPE  PMHx for HCV (tx with Joan Driscollr back in 7/2016), Cirrhosis stage 3,  HIV tx with Odefsey, and Depression with Insomnia. Lost to follow-up for 1.5 years. Has not been on HIV meds or followed up with [facility] during this time either. He is being seen at [place] [address] for tx with NP Augustin Ortiz. Griefparents passed away recently - 2019 mom, 2020 dad  Previously dealing with depression and insomnia  No other social support in family - does have a HIV + partner but recently started this relationship  Struggling with grief and insomnia. Good appetite. No energy. Can fall asleep but not sleeping at night. Did feel better on Lexapro and Trazodone. Prev using Zoloft and did seem to help with symptoms. No SI/HI. No AH/VH. Living in [address] [place] [address] Rd yrs ago and relocated to [facility]. Now on Tivicay and Odefsey.     (ABNORMAL) RPR (12/22/2021 3:04 PM EST)  (ABNORMAL) RPR (12/22/2021 3:04 PM EST)   Component Value Ref Range Performed At Pathologist Signature   RPR Qual Reactive (A) Nonreactive [facility] IMMUNOLOGY LABORATORY     RPR Titer 1:2   [facility]MMUN[facility]          (ABNORMAL) Vitamin D 25 Hydroxy (12/22/2021 3:04 PM EST)  (ABNORMAL) Vitamin D 25 Hydroxy (12/22/2021 3:04 PM EST)   Component Value Ref Range Performed At Pathologist Signature   Vitamin D, 25-OH 19.3 (L)  Comment:   <10 ng/mL Severe Hypovitaminosis D  10.0 - 19.9 ng/mL Moderate Hypovitaminosis D  20.0 - 29.9 ng/mL Mild Hypovitaminosis D  30.0 - 100.0 ng/mL Optimal  >100 ng/mL Potential for Toxicity may exist   30.0 - 100.0 ng/mL [facility] CHEMISTRY LABORATORY          (ABNORMAL) Syphilis Total Antibody (12/22/2021 3:04 PM EST)  (ABNORMAL) Syphilis Total Antibody (12/22/2021 3:04 PM EST)   Component Value Ref Range Performed At Pathologist Signature   Syphilis Total Ab Reactive (A) Nonreactive [facility] IMMUNOLOGY LABORATORY          (ABNORMAL) Lipid Panel (12/22/2021 3:04 PM EST)  (ABNORMAL) Lipid Panel (12/22/2021 3:04 PM EST)   Component Value Ref Range Performed At [facility]   Triglycerides 135 0 - 150 mg/dL [address]     Cholesterol 150 100 - 200 mg/dL [facility] CHEMISTRY LABORAT[facility]     LDL Calculated 94 0 - 130 mg/dL [facility] CHEMISTRY LAB[facility]     Cholesterol, HDL 32 (L) 40 - 150 mg/dL [facility] CHEMISTRY LAB[facility]     Non- 0 - 160 mg/dL [facility] CHEMISTRY [facility]     Cholesterol/HDL Ratio 4.7 0.0 - 5.2 [facility] CHEMISTRY LABORAT[facility]          (ABNORMAL) HIV-1 RNA Quantitative (12/22/2021 3:04 PM EST)  (ABNORMAL) HIV-1 RNA Quantitative (12/22/2021 3:04 PM EST)   Component Value Ref Range Performed At [facility]   HIV-1 RNA Qnt (Interp) Positive (A) Undetectable [facility] MOLECULAR DIAGNOSTICS LABOR[facility]ORY     HIV-1 RNA Qnt (log) 1.78 log [facility]            Hemoglobin A1c (12/22/2021 3:04 PM EST)  Hemoglobin A1c (12/22/2021 3:04 PM EST)   Component Value Ref Range Performed At [facility]   Hemoglobin A1c 5.4  Comment:   Interpretive values have not been established for ages <18 years.    For ages >=18 years, non-pregnant, and normal RBC turnover:  4.0-5.7% is low risk for diabetes  5.7-6.4% is increased risk for diabetes  >=6.5% is consistent with diabetes (regardless of age) and should be confirmed with 2 non-POCT HbA1c measurements.    <5.7 % [facility] CHEMISTRY LABORAT[facility]     Est Average Glucose Comment: Unable to [address]          (ABNORMAL) Comprehensive Metabolic Panel (07/03/3529 3:04 PM EST)  (ABNORMAL) Comprehensive Metabolic Panel (46/19/7427 3:04 PM EST)   Component Value Ref Range Performed At [facility]   Sodium 140 135 - 145 mmol/L [facility] CHEMISTRY LABORATORY     Potassium 4.5 3.6 - 5.1 mmol/L [facility] CHEMISTRY LABORAT[facility]     Potassium Comment Not Hemolyzed Not Hemolyzed [facility] CORE LABORATORY     Chloride 104 100 - 110 mmol/L [facility] CHEMISTRY LABORAT[facility]     Carbon Dioxide 28 21 - 33 mmol/L [facility] CHEMISTRY LABOR[facility]     Anion Gap 8 0 - 12 mmol/L [facility] CHEMISTRY LABOR[facility]     Glucose 50 (L) 65 - 100 mg/dL [facility] CHEMISTRY LABOR[facility]     BUN 10 8 - 23 mg/dL [facility] CHEMISTRY LABORAT[facility]     Creatinine 1.29 (H) 0.60 - 1.20 mg/dL [facility] CHEMISTRY LABORAT[facility]     GFRcr (Estimated) 68 >=60 mL/min/1.73m2 [facility] CHEMISTRY LABORAT[facility]     AST 18 0 - 50 U/L [facility] CHEMISTRY LABORAT[facility]     ALT 11 0 - 60 U/L [facility] CHEMISTRY LABOR[facility]     Alk Phos 96 0 - 120 U/L [facility] CHEMISTRY LABOR[facility]     Bilirubin, Total 0.4 0.0 - 1.3 mg/dL [facility] CHEMISTRY LAB[facility]     Protein, Total 7.0 6.4 - 8.5 g/dL [facility] CHEMISTRY [facility]     Albumin 3.9 3.7 - 5.2 g/dL [facility] CHEMISTRY [facility]     Globulin 3.1 1.3 - 3.5 g/dL [facility] CHEMISTRY LAB[facility]     Calcium 9.5 8.9 - 10.7 mg/dL [facility] CHEMISTRY LABORAT[facility]          (ABNORMAL) CD 3 / CD 4 / CD 8 Panel (12/22/2021 3:04 PM EST)  (ABNORMAL) CD 3 / CD 4 / CD 8 Panel (12/22/2021 3:04 PM EST)   Component Value Ref Range Performed At Pathologist Signature   CD3 Percent 88 49 - 90 % [facility] HEMATOPATHOLOGY LABORATORY     CD3 Absolute 4,034 (H) 744-2,656 /mm3 [facility] HEMATOPATHOLOGY LABORATORY     CD4 Percent 4 (L) 27 - 66 % [facility] HEMATOPATHOLOGY LABORATORY     CD4 Absolute 203 (L) 377-1,602 /mm3 [facility] HEMATOPATHOLOGY LABORATORY     CD8 Percent 82 (H) 8 - 48 % [facility] HEMATOPATHOLOGY LABORATORY     CD8 Absolute 3,736 (H) 120-1,113 /mm3 [facility] HEMATOPATHOLOGY LABORATORY     Help/Supp Comment     [facility] HEMATOPATHOLOGY LABORATORY     CD4/CD8 Ratio 0.06   [facility] HEMATOPATHOLOGY LABORATORY       CBC Auto Differential (12/22/2021 3:04 PM EST)  CBC Auto Differential (12/22/2021 3:04 PM EST)   Component Value Ref Range Performed At Pathologist Signature   WBC 6.1 3.7 - 9.7 10e9/L [facility] HEMATOLOGY LABORATORY     RBC 5.49 4.54 - 5.78 10e12/L [facility]EMATOLOGY [facility]     HGB 16.5 13.3 - 17.2 g/dL [facility] HEMATOLOGY [facility]     HCT 50.0 38.9 - 50.9 % [facility]EMATOLOGY [facility]     MCV 91.1 81.2 - 94.0 fL [facility] HEMATOLOGY LAB[facility]     MCH 30.1 25.7 - 32.2 pg [facility] HEMATOLOGY [facility]     MCHC 33.0 30.9 - 35.5 g/dL [facility]EMATOLOGY [facility]     RDW 12.1 11.5 - 14.1 % [facility] HEMATOLOGY LAB[facility]      179 - 373 10e9/L [facility] HEMATOLOGY LAB[facility]     MPV 9.0 8.7 - 12.1 fL [facility] HEMATOLOGY LAB[facility]     % NRBC 0.0 0.0 - 0.2 % [facility] HEMATOLOGY LAB[facility]     % Lev 53.5 % [facility] HEMATOLOGY LAB[facility]     % Lym 33.7 % [facility] HEMATOLOGY LAB[facility]     % Laurel 10.1 % [facility] HEMATOLOGY LAB[facility]     % Eos 2.0 % [facility] HEMATOLOGY LAB[facility]     % Baso 0.7 % [facility] HEMATOLOGY LAB[facility]     LEV 3.2 2.0 - 6.7 10e9/L [facility] HEMATOLOGY [facility]     LYM 2.0 1.1 - 3.3 10e9/L [facility] HEMATOLOGY LAB[facility]     MONO 0.6 0.2 - 0.7 10e9/L [facility] HEMATOLOGY LAB[facility]     EOS 0.1 0.0 - 0.4 10e9/L [facility] HEMATOLOGY [facility]     BASO <0.1 0.0 - 0.1 10e9/L [facility] HEMATOLOGY LAB[facility]           Smoking - 1 ppd      Vit D Def - has been taking weekly Vit D (prev on 2 x per week). Recent levels with ID showed Vit D of 91 so supplement was stopped.   We discussed that Vit D tox is rare and I would not expect this until levels were much higher.     ROS:  Constitutional: negative for fevers, chills and fatigue  Eyes: negative for visual disturbance  Ears, nose, mouth, throat, and face: negative for hearing loss and sore throat  Respiratory: negative for cough or dyspnea on exertion  Cardiovascular: negative for chest pain, dyspnea, palpitations, fatigue  Gastrointestinal: per HPI  Genitourinary:negative for frequency and dysuria  Integument/breast: negative for rash and skin lesion(s)  Hematologic/lymphatic: negative for easy bruising and bleeding  Musculoskeletal:negative for myalgias and muscle weakness  Neurological: negative for headaches and dizziness  Behavioral/Psych: per HPI     Objective:     Vitals:    01/10/22 1346   BP: 90/62   Pulse: 100   Resp: 16   Temp: 97.8 °F (36.6 °C)   TempSrc: Temporal   SpO2: 98%   Weight: 135 lb (61.2 kg)   Height: 5' 7\" (1.702 m)     Physical Examination:  General appearance - alert, well appearing, and in no distress  Eyes - sclera anicteric  Mouth - mucous membranes moist, pharynx normal without lesions  Neck - supple, no significant adenopathy  Lymphatics - no palpable lymphadenopathy, no hepatosplenomegaly  Chest - clear to auscultation, no wheezes, rales or rhonchi, symmetric air entry  Heart - normal rate, regular rhythm, normal S1, S2, no murmurs, rubs, clicks or gallops  Abdomen - soft, nontender, nondistended, no masses or organomegaly  Neurological - alert, oriented, normal speech, no focal findings or movement disorder noted  Musculoskeletal - no joint tenderness, deformity or swelling  Extremities - no edema  Psych - nml mood and affect    Past Medical History:   Diagnosis Date    Autoimmune disease ([facility]     2001    Liver disease     Hep C treated 7/2016    [facility] 2014    Depression    Tobacco use      Past Surgical History:   Procedure Laterality Date    HX LYMPH NODE DISSECTION Right 2001    Axilla    HX WISDOM TEETH EXTRACTION Bilateral 1995     Current Outpatient Medications on File Prior to Visit   Medication Sig Dispense Refill    dolutegravir sodium (TIVICAY PO) Take  by mouth.  ODEFSEY 200-25-25 mg tab       [DISCONTINUED] Tivicay 50 mg tab tablet       pravastatin (PRAVACHOL) 40 mg tablet Take 1 Tab by mouth daily. (Patient not taking: Reported on 1/10/2022) 90 Tab 1    [DISCONTINUED] Vitamin D2 1,250 mcg (50,000 unit) capsule Take 50,000 Units by mouth every thirty (30) days. (Patient not taking: Reported on 1/10/2022)      [DISCONTINUED] escitalopram oxalate (LEXAPRO) 10 mg tablet Take 1/2 tab by mouth daily x 1 week and then increase to 1 tab daily (Patient not taking: Reported on 1/10/2022) 90 Tab 0    [DISCONTINUED] traZODone (DESYREL) 100 mg tablet Take 1 Tab by mouth nightly. (Patient not taking: Reported on 1/10/2022) 90 Tab 1    acetaminophen (TYLENOL) 325 mg tablet Take 2 Tabs by mouth every six (6) hours as needed for Pain. (Patient not taking: Reported on 1/10/2022) 20 Tab 0     No current facility-administered medications on file prior to visit. Assessment/ Plan:   Diagnoses and all orders for this visit:    1. Well adult exam labs recently done and reviewed  -     REFERRAL FOR COLONOSCOPY    2. Asymptomatic HIV infection ([facility]  3. Low CD4 cell count determined by flow cytometry   uncontrolled recently due to being off medications for 1.5 years largely related to grief and depression. He has restarted medications recently so expecting significant improvement in CD4 count and viral load on Tivicay and Odefsey    4. Mixed hyperlipidemiaprevious issue on antivirals but most recent labs normal.  May need to restart pravastatin    5. Primary insomnia restarting trazodone  -     traZODone (DESYREL) 100 mg tablet; Take 1 Tablet by mouth nightly. 6. Griefdiscussed symptoms and will start Remeron and restart trazodone  -     mirtazapine (REMERON) 7.5 mg tablet;  Take 1 Tablet by mouth nightly. -     traZODone (DESYREL) 100 mg tablet; Take 1 Tablet by mouth nightly. 7. Vitamin D deficiency  -     cholecalciferol (VITAMIN D3) (50,000 UNITS /1250 MCG) capsule; Take 1 Capsule by mouth every seven (7) days for 8 doses. For 8 weeks    8. Screen for colon cancer  -     REFERRAL FOR COLONOSCOPY    9. Tobacco abuseencourage cessation    10. Erectile dysfunction, unspecified erectile dysfunction type  -     sildenafil citrate (VIAGRA) 50 mg tablet; Take 1 Tablet by mouth daily as needed for Erectile Dysfunction. 11. Chronic hepatitis C without hepatic coma (HCC)treated in 2016 with Kash Forde    I have discussed the diagnosis with the patient and the intended plan as seen in the above orders. The patient has received an after-visit summary and questions were answered concerning future plans. I have discussed medication side effects and warnings with the patient as well. The patient verbalizes understanding and agreement with the plan. Follow-up and Dispositions    · Return in about 6 weeks (around 2/21/2022), or if symptoms worsen or fail to improve.

## 2022-02-21 ENCOUNTER — VIRTUAL VISIT (OUTPATIENT)
Dept: FAMILY MEDICINE CLINIC | Age: 52
End: 2022-02-21

## 2022-02-21 RX ORDER — SULFAMETHOXAZOLE AND TRIMETHOPRIM 800; 160 MG/1; MG/1
1 TABLET ORAL DAILY
COMMUNITY
Start: 2022-02-12

## 2022-02-21 NOTE — PROGRESS NOTES
Chief Complaint   Patient presents with    Follow-up       1. Have you been to the ER, urgent care clinic since your last visit? Hospitalized since your last visit? No     2. Have you seen or consulted any other health care providers outside of the [address] [place] [address] since your last visit? Include any pap smears or colon screening.  No

## 2022-02-23 ENCOUNTER — VIRTUAL VISIT (OUTPATIENT)
Dept: FAMILY MEDICINE CLINIC | Age: 52
End: 2022-02-23
Payer: MEDICAID

## 2022-02-23 DIAGNOSIS — B20 CURRENTLY ASYMPTOMATIC HIV INFECTION, WITH HISTORY OF HIV-RELATED ILLNESS (HCC): ICD-10-CM

## 2022-02-23 DIAGNOSIS — R06.09 DOE (DYSPNEA ON EXERTION): ICD-10-CM

## 2022-02-23 DIAGNOSIS — N28.1 BILATERAL RENAL CYSTS: ICD-10-CM

## 2022-02-23 DIAGNOSIS — F33.0 MILD EPISODE OF RECURRENT MAJOR DEPRESSIVE DISORDER (HCC): Primary | ICD-10-CM

## 2022-02-23 DIAGNOSIS — B18.2 CHRONIC HEPATITIS C WITHOUT HEPATIC COMA (HCC): ICD-10-CM

## 2022-02-23 DIAGNOSIS — Z72.0 TOBACCO ABUSE: ICD-10-CM

## 2022-02-23 DIAGNOSIS — F51.01 PRIMARY INSOMNIA: ICD-10-CM

## 2022-02-23 DIAGNOSIS — Z87.891 PERSONAL HISTORY OF TOBACCO USE, PRESENTING HAZARDS TO HEALTH: ICD-10-CM

## 2022-02-23 DIAGNOSIS — F43.21 GRIEF: ICD-10-CM

## 2022-02-23 DIAGNOSIS — E78.2 MIXED HYPERLIPIDEMIA: ICD-10-CM

## 2022-02-23 PROCEDURE — 99215 OFFICE O/P EST HI 40 MIN: CPT | Performed by: FAMILY MEDICINE

## 2022-02-23 RX ORDER — MIRTAZAPINE 15 MG/1
15 TABLET, ORALLY DISINTEGRATING ORAL
Qty: 90 TABLET | Refills: 0 | Status: SHIPPED | OUTPATIENT
Start: 2022-02-23 | End: 2022-05-20

## 2022-02-23 NOTE — PROGRESS NOTES
Chief Complaint   Patient presents with    Depression     Follow-up   1. Have you been to the ER, urgent care clinic since your last visit? Hospitalized since your last visit? No    2. Have you seen or consulted any other health care providers outside of the [address] [place] [address] since your last visit? Include any pap smears or colon screening.  No

## 2022-02-23 NOTE — PROGRESS NOTES
Harjeet Carrion (: 1970) is a 49 y.o. male, established patient, here for evaluation of the following chief complaint(s):   Depression (Follow-up)       ASSESSMENT/PLAN:  Doing better overall with medications for depression. Back on track with HIV medications and recently had testing that was positive but under the detectable level. Reviewed routine screening. Encouraged smoking cessation plan. Discussed CT low-dose lung cancer screening based on USPSTF recommendations but may not qualify by CMS recommendations so may not be able to progress until the age of 55. He does have some very mild debility that has been ongoing for years so also sending for PFTs as he has some mild COPD given extensive smoking history. Reviewed retroperitoneal ultrasound showing bilateral renal cysts that were noted to be simple cysts and did not require any follow-up. Below is the assessment and plan developed based on review of pertinent history, labs, studies, and medications. 1. Mild episode of recurrent major depressive disorder (HCC)  -     mirtazapine (REMERON SOL-TAB) 15 mg disintegrating tablet; Take 1 Tablet by mouth nightly., Normal, Disp-90 Tablet, R-0  2. Grief  -     mirtazapine (REMERON SOL-TAB) 15 mg disintegrating tablet; Take 1 Tablet by mouth nightly., Normal, Disp-90 Tablet, R-0  3. Currently asymptomatic HIV infection, with history of HIV-related illness ([facility]  -     mirtazapine (REMERON SOL-TAB) 15 mg disintegrating tablet; Take 1 Tablet by mouth nightly., Normal, Disp-90 Tablet, R-0  4. Chronic hepatitis C without hepatic coma ([facility]  5. Tobacco abuse  6. Personal history of tobacco use, presenting hazards to health  -     CT LOW DOSE LUNG CANCER SCREENING; Future  7. DAVIS (dyspnea on exertion)  -     PULMONARY FUNCTION TEST; Future  8. Primary insomnia  9. Mixed hyperlipidemia  10.  Bilateral renal cysts      Return in about 6 weeks (around 2022), or if symptoms worsen or fail to improve. SUBJECTIVE/OBJECTIVE:  PMHx for HCV (tx with Harvoni back in 7/2016), Cirrhosis stage 3,  HIV tx with Odefsey, and Depression with Insomnia. Lost to follow-up for 1.5 years and was off HIV meds during that time. He has since reestablished care - [facility] with NP Geoffrey Lund. Now on Tivicay and Odefsey. Grief/Depression - Taking with Remeron QHS and Trazadone PRN which has helped with sleep. Currently sleeping from 10-11pm and waking up occ as late as 3pm when combining medications. Parents passed away recently - 2019 mom, 2020 dad. Recently found out that uncle has cancer and recently stopped treatment. Still with occ crying spells. No other social support in family - does have a HIV + partner but recently started this relationship  Struggling with grief and insomnia but much better with meds as above. Good appetite. Previously dealing with depression and insomnia Prev using let us O, trazodone, and Zoloft but does not recall improvement with symptoms. No SI/HI. No AH/VH. Living in [address] [place] [address] Rd yrs ago and relocated to [facility]. Recently started back at work recently and is walking 3 miles per day.     Reviewed recent labs in 12/2021 from [facility]:  Lipids normal with LDL 94  A1c 5.4%  GFR 68 and Cr 1.29  LFTs normal  CD4 203    (ABNORMAL) RPR (12/22/2021 3:04 PM EST)  (ABNORMAL) RPR (12/22/2021 3:04 PM EST)   Component Value Ref Range Performed At Pathologist Signature   RPR Qual Reactive (A) Nonreactive [facility] IMMUNOLOGY LABORATORY     RPR Titer 1:2   [facility]MMUNOLOG[facility]       (ABNORMAL) HIV-1 RNA Quantitative (12/22/2021 3:04 PM EST)  (ABNORMAL) HIV-1 RNA Quantitative (12/22/2021 3:04 PM EST)   Component Value Ref Range Performed At Pathologist [facility]   HIV-1 RNA Qnt (Interp) Positive (A) Undetectable [facility] MOLECULAR DIAGNOSTICS LABOR[facility]     HIV-1 RNA Qnt (log) 1.78 log [facility]       ROS  Gen - no fever/chills  Resp - no dyspnea or cough  CV - no chest pain or DAVIS  Rest per HPI     No data recorded     Physical exam:  General appearance - alert, well appearing, and in no distress  Eyes -sclera anicteric, no discharge  HEENT normocephalic, atraumatic, moist mucous membranes, no visualized neck mass  Chest -normal respiratory effort, no visualized signs of respiratory distress  Neurological - alert, awake, normal speech, no focal findings or movement disorder noted  Psych - normal mood and affect  Skin no apparent lesions    On this date 02/23/2022 I have spent 40 minutes reviewing previous notes, test results and face to face (virtual) with the patient discussing the diagnosis and importance of compliance with the treatment plan as well as documenting on the day of the visit. Joyce Joya, was evaluated through a synchronous (real-time) audio-video encounter. The patient (or guardian if applicable) is aware that this is a billable service, which includes applicable co-pays. Verbal consent to proceed has been obtained. The visit was conducted pursuant to the emergency declaration under the [address] [place] [address], [address] [place] [address] authority and the [employer] and [place] General Act. Patient identification was verified, and a caregiver was present when appropriate. The patient was located at home in a state where the provider was licensed to provide care. An electronic signature was used to authenticate this note.   -- Sherri Staples MD

## 2022-02-28 DIAGNOSIS — E55.9 VITAMIN D DEFICIENCY: ICD-10-CM

## 2022-02-28 RX ORDER — ASPIRIN 325 MG
TABLET, DELAYED RELEASE (ENTERIC COATED) ORAL
Qty: 8 CAPSULE | Refills: 0 | Status: SHIPPED | OUTPATIENT
Start: 2022-02-28 | End: 2022-06-06

## 2022-03-04 ENCOUNTER — HOSPITAL ENCOUNTER (OUTPATIENT)
Dept: PREADMISSION TESTING | Age: 52
Discharge: HOME | End: 2022-03-04
Payer: MEDICAID

## 2022-03-04 PROCEDURE — U0005 INFEC AGEN DETEC AMPLI PROBE: HCPCS

## 2022-03-05 LAB
SARS-COV-2, XPLCVT: NOT DETECTED
SOURCE, COVRS: NORMAL

## 2022-03-09 ENCOUNTER — HOSPITAL ENCOUNTER (OUTPATIENT)
Dept: PULMONOLOGY | Age: 52
Discharge: HOME | End: 2022-03-09
Attending: FAMILY MEDICINE
Payer: MEDICAID

## 2022-03-09 ENCOUNTER — HOSPITAL ENCOUNTER (OUTPATIENT)
Dept: CT IMAGING | Age: 52
Discharge: HOME | End: 2022-03-09
Attending: FAMILY MEDICINE
Payer: MEDICAID

## 2022-03-09 DIAGNOSIS — R06.09 DOE (DYSPNEA ON EXERTION): ICD-10-CM

## 2022-03-09 DIAGNOSIS — Z87.891 PERSONAL HISTORY OF TOBACCO USE, PRESENTING HAZARDS TO HEALTH: ICD-10-CM

## 2022-03-09 PROCEDURE — 94729 DIFFUSING CAPACITY: CPT

## 2022-03-09 PROCEDURE — 94375 RESPIRATORY FLOW VOLUME LOOP: CPT

## 2022-03-09 PROCEDURE — 94726 PLETHYSMOGRAPHY LUNG VOLUMES: CPT

## 2022-03-09 PROCEDURE — 71271 CT THORAX LUNG CANCER SCR C-: CPT

## 2022-03-18 PROBLEM — B19.20 HEPATITIS C VIRUS INFECTION: Status: ACTIVE | Noted: 2017-03-07

## 2022-03-19 PROBLEM — B20 HIV (HUMAN IMMUNODEFICIENCY VIRUS INFECTION) (HCC): Status: ACTIVE | Noted: 2017-03-07

## 2022-04-06 DIAGNOSIS — F43.21 GRIEF: ICD-10-CM

## 2022-04-06 DIAGNOSIS — F51.01 PRIMARY INSOMNIA: ICD-10-CM

## 2022-04-06 RX ORDER — TRAZODONE HYDROCHLORIDE 100 MG/1
TABLET ORAL
Qty: 90 TABLET | Refills: 0 | Status: SHIPPED | OUTPATIENT
Start: 2022-04-06 | End: 2022-04-19

## 2022-04-19 DIAGNOSIS — F51.01 PRIMARY INSOMNIA: ICD-10-CM

## 2022-04-19 DIAGNOSIS — F43.21 GRIEF: ICD-10-CM

## 2022-04-19 RX ORDER — TRAZODONE HYDROCHLORIDE 100 MG/1
TABLET ORAL
Qty: 90 TABLET | Refills: 0 | Status: SHIPPED | OUTPATIENT
Start: 2022-04-19

## 2022-04-21 NOTE — PROCEDURES
[address]  PULMONARY FUNCTION TEST    Name:  Addy Munoz  MR#:  [id number]  :  1970  ACCOUNT #:  [de-identified]  DATE OF SERVICE:  2022    REASON FOR THE TEST:  Shortness of breath. Spirometry and lung volumes were performed and they revealed:  1. No airflow obstruction. 2.  No restrictive lung disease. 3.  Normal DLCO. 4.  Normal flow-volume loop.       Long Tenorio MD      [id number]  D:  2022 11:26  T:  2022 13:33  JOB #:  [id number]  CC:

## 2022-05-19 DIAGNOSIS — F43.21 GRIEF: ICD-10-CM

## 2022-05-19 DIAGNOSIS — B20 CURRENTLY ASYMPTOMATIC HIV INFECTION, WITH HISTORY OF HIV-RELATED ILLNESS (HCC): ICD-10-CM

## 2022-05-19 DIAGNOSIS — F33.0 MILD EPISODE OF RECURRENT MAJOR DEPRESSIVE DISORDER (HCC): ICD-10-CM

## 2022-05-20 RX ORDER — MIRTAZAPINE 15 MG/1
TABLET, ORALLY DISINTEGRATING ORAL
Qty: 90 TABLET | Refills: 0 | Status: SHIPPED | OUTPATIENT
Start: 2022-05-20 | End: 2022-08-17

## 2022-06-06 DIAGNOSIS — E55.9 VITAMIN D DEFICIENCY: ICD-10-CM

## 2022-06-06 RX ORDER — ASPIRIN 325 MG
TABLET, DELAYED RELEASE (ENTERIC COATED) ORAL
Qty: 8 CAPSULE | Refills: 0 | Status: SHIPPED | OUTPATIENT
Start: 2022-06-06

## 2022-06-17 ENCOUNTER — DOCUMENTATION ONLY (OUTPATIENT)
Dept: FAMILY MEDICINE CLINIC | Age: 52
End: 2022-06-17

## 2022-06-17 NOTE — PROGRESS NOTES
Called and left message to schedule appointment with Dr Isidro Gomez to follow-up and letter mailed to home address.

## 2022-08-17 DIAGNOSIS — F43.21 GRIEF: ICD-10-CM

## 2022-08-17 DIAGNOSIS — B20 CURRENTLY ASYMPTOMATIC HIV INFECTION, WITH HISTORY OF HIV-RELATED ILLNESS (HCC): ICD-10-CM

## 2022-08-17 DIAGNOSIS — F33.0 MILD EPISODE OF RECURRENT MAJOR DEPRESSIVE DISORDER (HCC): ICD-10-CM

## 2022-08-17 RX ORDER — MIRTAZAPINE 15 MG/1
TABLET, ORALLY DISINTEGRATING ORAL
Qty: 90 TABLET | Refills: 0 | Status: SHIPPED | OUTPATIENT
Start: 2022-08-17

## 2022-11-15 DIAGNOSIS — F33.0 MILD EPISODE OF RECURRENT MAJOR DEPRESSIVE DISORDER (HCC): ICD-10-CM

## 2022-11-15 DIAGNOSIS — F43.21 GRIEF: ICD-10-CM

## 2022-11-15 DIAGNOSIS — B20 CURRENTLY ASYMPTOMATIC HIV INFECTION, WITH HISTORY OF HIV-RELATED ILLNESS (HCC): ICD-10-CM

## 2022-11-15 RX ORDER — MIRTAZAPINE 15 MG/1
TABLET, ORALLY DISINTEGRATING ORAL
Qty: 90 TABLET | Refills: 0 | Status: SHIPPED | OUTPATIENT
Start: 2022-11-15

## 2023-02-13 DIAGNOSIS — F43.21 GRIEF: ICD-10-CM

## 2023-02-13 DIAGNOSIS — F33.0 MILD EPISODE OF RECURRENT MAJOR DEPRESSIVE DISORDER (HCC): ICD-10-CM

## 2023-02-13 DIAGNOSIS — B20 CURRENTLY ASYMPTOMATIC HIV INFECTION, WITH HISTORY OF HIV-RELATED ILLNESS (HCC): ICD-10-CM

## 2023-02-17 ENCOUNTER — PATIENT MESSAGE (OUTPATIENT)
Dept: FAMILY MEDICINE CLINIC | Age: 53
End: 2023-02-17

## 2023-02-17 DIAGNOSIS — F33.0 MILD EPISODE OF RECURRENT MAJOR DEPRESSIVE DISORDER (HCC): ICD-10-CM

## 2023-02-17 DIAGNOSIS — F43.21 GRIEF: ICD-10-CM

## 2023-02-17 DIAGNOSIS — B20 CURRENTLY ASYMPTOMATIC HIV INFECTION, WITH HISTORY OF HIV-RELATED ILLNESS (HCC): ICD-10-CM

## 2023-02-17 RX ORDER — MIRTAZAPINE 15 MG/1
TABLET, ORALLY DISINTEGRATING ORAL
Qty: 90 TABLET | Refills: 0 | Status: SHIPPED | OUTPATIENT
Start: 2023-02-17

## 2023-02-22 RX ORDER — MIRTAZAPINE 15 MG/1
TABLET, ORALLY DISINTEGRATING ORAL
Qty: 90 TABLET | Refills: 0 | Status: SHIPPED | OUTPATIENT
Start: 2023-02-22

## 2023-05-22 RX ORDER — SILDENAFIL 50 MG/1
50 TABLET, FILM COATED ORAL DAILY PRN
COMMUNITY
Start: 2022-01-10

## 2023-05-22 RX ORDER — SULFAMETHOXAZOLE AND TRIMETHOPRIM 800; 160 MG/1; MG/1
1 TABLET ORAL DAILY
COMMUNITY
Start: 2022-02-12

## 2023-05-22 RX ORDER — TRAZODONE HYDROCHLORIDE 100 MG/1
1 TABLET ORAL NIGHTLY
COMMUNITY
Start: 2022-04-19

## 2023-05-22 RX ORDER — MIRTAZAPINE 15 MG/1
TABLET, ORALLY DISINTEGRATING ORAL
COMMUNITY
Start: 2023-02-22

## 2023-07-03 SDOH — ECONOMIC STABILITY: TRANSPORTATION INSECURITY
IN THE PAST 12 MONTHS, HAS LACK OF TRANSPORTATION KEPT YOU FROM MEETINGS, WORK, OR FROM GETTING THINGS NEEDED FOR DAILY LIVING?: NO

## 2023-07-03 SDOH — ECONOMIC STABILITY: FOOD INSECURITY: WITHIN THE PAST 12 MONTHS, THE FOOD YOU BOUGHT JUST DIDN'T LAST AND YOU DIDN'T HAVE MONEY TO GET MORE.: SOMETIMES TRUE

## 2023-07-03 SDOH — ECONOMIC STABILITY: INCOME INSECURITY: HOW HARD IS IT FOR YOU TO PAY FOR THE VERY BASICS LIKE FOOD, HOUSING, MEDICAL CARE, AND HEATING?: SOMEWHAT HARD

## 2023-07-03 SDOH — ECONOMIC STABILITY: HOUSING INSECURITY
IN THE LAST 12 MONTHS, WAS THERE A TIME WHEN YOU DID NOT HAVE A STEADY PLACE TO SLEEP OR SLEPT IN A SHELTER (INCLUDING NOW)?: NO

## 2023-07-03 SDOH — ECONOMIC STABILITY: FOOD INSECURITY: WITHIN THE PAST 12 MONTHS, YOU WORRIED THAT YOUR FOOD WOULD RUN OUT BEFORE YOU GOT MONEY TO BUY MORE.: SOMETIMES TRUE

## 2023-07-06 ENCOUNTER — OFFICE VISIT (OUTPATIENT)
Age: 53
End: 2023-07-06
Payer: MEDICAID

## 2023-07-06 VITALS
HEIGHT: 67 IN | OXYGEN SATURATION: 97 % | RESPIRATION RATE: 20 BRPM | DIASTOLIC BLOOD PRESSURE: 58 MMHG | TEMPERATURE: 97.7 F | BODY MASS INDEX: 21 KG/M2 | SYSTOLIC BLOOD PRESSURE: 87 MMHG | HEART RATE: 69 BPM | WEIGHT: 133.8 LBS

## 2023-07-06 DIAGNOSIS — Z72.0 TOBACCO USE: ICD-10-CM

## 2023-07-06 DIAGNOSIS — E78.2 MIXED HYPERLIPIDEMIA: ICD-10-CM

## 2023-07-06 DIAGNOSIS — B18.2 CHRONIC HEPATITIS C WITH HEPATIC COMA (HCC): ICD-10-CM

## 2023-07-06 DIAGNOSIS — Z00.00 WELL ADULT EXAM: Primary | ICD-10-CM

## 2023-07-06 DIAGNOSIS — Z12.11 COLON CANCER SCREENING: ICD-10-CM

## 2023-07-06 DIAGNOSIS — F33.0 MAJOR DEPRESSIVE DISORDER, RECURRENT, MILD (HCC): ICD-10-CM

## 2023-07-06 DIAGNOSIS — E55.9 VITAMIN D DEFICIENCY: ICD-10-CM

## 2023-07-06 DIAGNOSIS — Z21 ASYMPTOMATIC HIV INFECTION (HCC): ICD-10-CM

## 2023-07-06 DIAGNOSIS — F19.90 SUBSTANCE USE DISORDER: ICD-10-CM

## 2023-07-06 DIAGNOSIS — Z87.891 PERSONAL HISTORY OF TOBACCO USE: ICD-10-CM

## 2023-07-06 PROCEDURE — 99396 PREV VISIT EST AGE 40-64: CPT | Performed by: FAMILY MEDICINE

## 2023-07-06 PROCEDURE — 99214 OFFICE O/P EST MOD 30 MIN: CPT | Performed by: FAMILY MEDICINE

## 2023-07-06 PROCEDURE — G0296 VISIT TO DETERM LDCT ELIG: HCPCS | Performed by: FAMILY MEDICINE

## 2023-07-06 ASSESSMENT — PATIENT HEALTH QUESTIONNAIRE - PHQ9
1. LITTLE INTEREST OR PLEASURE IN DOING THINGS: 0
SUM OF ALL RESPONSES TO PHQ QUESTIONS 1-9: 0
SUM OF ALL RESPONSES TO PHQ QUESTIONS 1-9: 0
SUM OF ALL RESPONSES TO PHQ9 QUESTIONS 1 & 2: 0
2. FEELING DOWN, DEPRESSED OR HOPELESS: 0
SUM OF ALL RESPONSES TO PHQ QUESTIONS 1-9: 0
SUM OF ALL RESPONSES TO PHQ QUESTIONS 1-9: 0

## 2023-07-06 NOTE — PROGRESS NOTES
Chief Complaint   Patient presents with    Other     Routine follow-up     1. Have you been to the ER, urgent care clinic since your last visit? Hospitalized since your last visit? No    2. Have you seen or consulted any other health care providers outside of the [address] [place] [address] since your last visit? Include any pap smears or colon screening.  Yes Reason for visit: [facility] Doctor
50-77 with at least a 20 pack-year smoking history who currently smoke or have quit in the last 15 years

## 2023-07-19 ENCOUNTER — HOSPITAL ENCOUNTER (OUTPATIENT)
Facility: HOSPITAL | Age: 53
Discharge: HOME | End: 2023-07-22
Attending: FAMILY MEDICINE
Payer: MEDICAID

## 2023-07-19 DIAGNOSIS — Z87.891 PERSONAL HISTORY OF TOBACCO USE: ICD-10-CM

## 2023-07-19 PROCEDURE — 71271 CT THORAX LUNG CANCER SCR C-: CPT
